# Patient Record
Sex: FEMALE | Race: WHITE | NOT HISPANIC OR LATINO | Employment: FULL TIME | URBAN - METROPOLITAN AREA
[De-identification: names, ages, dates, MRNs, and addresses within clinical notes are randomized per-mention and may not be internally consistent; named-entity substitution may affect disease eponyms.]

---

## 2017-08-30 ENCOUNTER — OFFICE VISIT (OUTPATIENT)
Dept: FAMILY MEDICINE CLINIC | Facility: CLINIC | Age: 52
End: 2017-08-30

## 2017-08-30 VITALS
WEIGHT: 198 LBS | OXYGEN SATURATION: 99 % | RESPIRATION RATE: 16 BRPM | SYSTOLIC BLOOD PRESSURE: 138 MMHG | HEIGHT: 67 IN | DIASTOLIC BLOOD PRESSURE: 74 MMHG | HEART RATE: 55 BPM | BODY MASS INDEX: 31.08 KG/M2

## 2017-08-30 DIAGNOSIS — R07.9 CHEST PAIN, UNSPECIFIED TYPE: Primary | ICD-10-CM

## 2017-08-30 LAB
ARTICHOKE IGE QN: 149 MG/DL (ref 0–130)
CHOLEST SERPL-MCNC: 218 MG/DL (ref 0–200)
HDLC SERPL-MCNC: 56 MG/DL (ref 40–60)
TRIGL SERPL-MCNC: 133 MG/DL (ref 0–150)

## 2017-08-30 PROCEDURE — 36415 COLL VENOUS BLD VENIPUNCTURE: CPT | Performed by: FAMILY MEDICINE

## 2017-08-30 PROCEDURE — 80061 LIPID PANEL: CPT | Performed by: FAMILY MEDICINE

## 2017-08-30 PROCEDURE — 99214 OFFICE O/P EST MOD 30 MIN: CPT | Performed by: FAMILY MEDICINE

## 2017-09-06 PROCEDURE — 93000 ELECTROCARDIOGRAM COMPLETE: CPT | Performed by: FAMILY MEDICINE

## 2017-09-07 ENCOUNTER — HOSPITAL ENCOUNTER (OUTPATIENT)
Dept: CARDIOLOGY | Facility: HOSPITAL | Age: 52
Discharge: HOME | End: 2017-09-07
Attending: FAMILY MEDICINE | Admitting: FAMILY MEDICINE

## 2017-09-07 VITALS
HEART RATE: 66 BPM | WEIGHT: 198 LBS | BODY MASS INDEX: 31.08 KG/M2 | HEIGHT: 67 IN | DIASTOLIC BLOOD PRESSURE: 84 MMHG | SYSTOLIC BLOOD PRESSURE: 120 MMHG

## 2017-09-07 DIAGNOSIS — R07.9 CHEST PAIN, UNSPECIFIED TYPE: ICD-10-CM

## 2017-09-07 PROCEDURE — 93017 CV STRESS TEST TRACING ONLY: CPT

## 2017-09-15 LAB
BH CV STRESS BP STAGE 1: NORMAL
BH CV STRESS BP STAGE 2: NORMAL
BH CV STRESS BP STAGE 3: NORMAL
BH CV STRESS DURATION MIN STAGE 1: 3
BH CV STRESS DURATION MIN STAGE 2: 3
BH CV STRESS DURATION MIN STAGE 3: 3
BH CV STRESS DURATION MIN STAGE 4: 0
BH CV STRESS DURATION MIN STAGE 5: 0
BH CV STRESS DURATION SEC STAGE 1: 0
BH CV STRESS DURATION SEC STAGE 2: 0
BH CV STRESS DURATION SEC STAGE 3: 0
BH CV STRESS DURATION SEC STAGE 4: 50
BH CV STRESS GRADE STAGE 1: 10
BH CV STRESS GRADE STAGE 2: 12
BH CV STRESS GRADE STAGE 3: 14
BH CV STRESS GRADE STAGE 4: 16
BH CV STRESS GRADE STAGE 5: 18
BH CV STRESS HR STAGE 1: 106
BH CV STRESS HR STAGE 2: 137
BH CV STRESS HR STAGE 3: 164
BH CV STRESS METS STAGE 1: 5
BH CV STRESS METS STAGE 2: 7.5
BH CV STRESS METS STAGE 3: 10
BH CV STRESS METS STAGE 4: 13.5
BH CV STRESS METS STAGE 5: 15
BH CV STRESS O2 STAGE 1: 98
BH CV STRESS O2 STAGE 2: 99
BH CV STRESS O2 STAGE 3: 100
BH CV STRESS PROTOCOL 1: NORMAL
BH CV STRESS RECOVERY BP: NORMAL MMHG
BH CV STRESS RECOVERY HR: 84 BPM
BH CV STRESS SPEED STAGE 1: 1.7
BH CV STRESS SPEED STAGE 2: 2.5
BH CV STRESS SPEED STAGE 3: 3.4
BH CV STRESS SPEED STAGE 4: 4.2
BH CV STRESS SPEED STAGE 5: 5
BH CV STRESS STAGE 1: 1
BH CV STRESS STAGE 2: 2
BH CV STRESS STAGE 3: 3
BH CV STRESS STAGE 4: 4
BH CV STRESS STAGE 5: 5
MAXIMAL PREDICTED HEART RATE: 169 BPM
PERCENT MAX PREDICTED HR: 101.18 %
STRESS BASELINE BP: NORMAL MMHG
STRESS BASELINE HR: 65 BPM
STRESS O2 SAT REST: 99 %
STRESS PERCENT HR: 119 %
STRESS POST ESTIMATED WORKLOAD: 11.4 METS
STRESS POST EXERCISE DUR MIN: 9 MIN
STRESS POST EXERCISE DUR SEC: 50 SEC
STRESS POST O2 SAT PEAK: 99 %
STRESS POST PEAK BP: NORMAL MMHG
STRESS POST PEAK HR: 171 BPM
STRESS TARGET HR: 144 BPM

## 2019-02-11 ENCOUNTER — OFFICE VISIT (OUTPATIENT)
Dept: FAMILY MEDICINE CLINIC | Facility: CLINIC | Age: 54
End: 2019-02-11

## 2019-02-11 VITALS
RESPIRATION RATE: 16 BRPM | OXYGEN SATURATION: 98 % | SYSTOLIC BLOOD PRESSURE: 118 MMHG | DIASTOLIC BLOOD PRESSURE: 74 MMHG | HEIGHT: 67 IN | WEIGHT: 180.2 LBS | BODY MASS INDEX: 28.28 KG/M2 | HEART RATE: 64 BPM

## 2019-02-11 DIAGNOSIS — Z00.00 HEALTHCARE MAINTENANCE: Primary | ICD-10-CM

## 2019-02-11 LAB
ALBUMIN SERPL-MCNC: 4.78 G/DL (ref 3.2–4.8)
ALBUMIN/GLOB SERPL: 2.2 G/DL (ref 1.5–2.5)
ALP SERPL-CCNC: 41 U/L (ref 25–100)
ALT SERPL W P-5'-P-CCNC: 23 U/L (ref 7–40)
ANION GAP SERPL CALCULATED.3IONS-SCNC: 6 MMOL/L (ref 3–11)
ARTICHOKE IGE QN: 186 MG/DL (ref 0–130)
AST SERPL-CCNC: 20 U/L (ref 0–33)
BASOPHILS # BLD AUTO: 0.03 10*3/MM3 (ref 0–0.2)
BASOPHILS NFR BLD AUTO: 0.6 % (ref 0–1)
BILIRUB SERPL-MCNC: 0.6 MG/DL (ref 0.3–1.2)
BUN BLD-MCNC: 18 MG/DL (ref 9–23)
BUN/CREAT SERPL: 18.6 (ref 7–25)
CALCIUM SPEC-SCNC: 9.7 MG/DL (ref 8.7–10.4)
CHLORIDE SERPL-SCNC: 109 MMOL/L (ref 99–109)
CHOLEST SERPL-MCNC: 255 MG/DL (ref 0–200)
CO2 SERPL-SCNC: 27 MMOL/L (ref 20–31)
CREAT BLD-MCNC: 0.97 MG/DL (ref 0.6–1.3)
CRP SERPL-MCNC: 0.17 MG/DL (ref 0–1)
DEPRECATED RDW RBC AUTO: 45 FL (ref 37–54)
EOSINOPHIL # BLD AUTO: 0.11 10*3/MM3 (ref 0–0.3)
EOSINOPHIL NFR BLD AUTO: 2.1 % (ref 0–3)
ERYTHROCYTE [DISTWIDTH] IN BLOOD BY AUTOMATED COUNT: 12.9 % (ref 11.3–14.5)
GFR SERPL CREATININE-BSD FRML MDRD: 60 ML/MIN/1.73
GLOBULIN UR ELPH-MCNC: 2.2 GM/DL
GLUCOSE BLD-MCNC: 92 MG/DL (ref 70–100)
HBA1C MFR BLD: 5.3 % (ref 4.8–5.6)
HCT VFR BLD AUTO: 44.8 % (ref 34.5–44)
HCV AB SER DONR QL: NORMAL
HDLC SERPL-MCNC: 59 MG/DL (ref 40–60)
HGB BLD-MCNC: 14.5 G/DL (ref 11.5–15.5)
HIV1+2 AB SER QL: NORMAL
IMM GRANULOCYTES # BLD AUTO: 0.02 10*3/MM3 (ref 0–0.05)
IMM GRANULOCYTES NFR BLD AUTO: 0.4 % (ref 0–0.6)
LYMPHOCYTES # BLD AUTO: 2.01 10*3/MM3 (ref 0.6–4.8)
LYMPHOCYTES NFR BLD AUTO: 39 % (ref 24–44)
MCH RBC QN AUTO: 31 PG (ref 27–31)
MCHC RBC AUTO-ENTMCNC: 32.4 G/DL (ref 32–36)
MCV RBC AUTO: 95.7 FL (ref 80–99)
MONOCYTES # BLD AUTO: 0.34 10*3/MM3 (ref 0–1)
MONOCYTES NFR BLD AUTO: 6.6 % (ref 0–12)
NEUTROPHILS # BLD AUTO: 2.67 10*3/MM3 (ref 1.5–8.3)
NEUTROPHILS NFR BLD AUTO: 51.7 % (ref 41–71)
PLATELET # BLD AUTO: 137 10*3/MM3 (ref 150–450)
PMV BLD AUTO: 11.3 FL (ref 6–12)
POTASSIUM BLD-SCNC: 4.8 MMOL/L (ref 3.5–5.5)
PROT SERPL-MCNC: 7 G/DL (ref 5.7–8.2)
RBC # BLD AUTO: 4.68 10*6/MM3 (ref 3.89–5.14)
SODIUM BLD-SCNC: 142 MMOL/L (ref 132–146)
TRIGL SERPL-MCNC: 69 MG/DL (ref 0–150)
TSH SERPL DL<=0.05 MIU/L-ACNC: 1.9 MIU/ML (ref 0.35–5.35)
URATE SERPL-MCNC: 6.5 MG/DL (ref 3.1–7.8)
WBC NRBC COR # BLD: 5.16 10*3/MM3 (ref 3.5–10.8)

## 2019-02-11 PROCEDURE — 84550 ASSAY OF BLOOD/URIC ACID: CPT | Performed by: FAMILY MEDICINE

## 2019-02-11 PROCEDURE — 83036 HEMOGLOBIN GLYCOSYLATED A1C: CPT | Performed by: FAMILY MEDICINE

## 2019-02-11 PROCEDURE — 99396 PREV VISIT EST AGE 40-64: CPT | Performed by: FAMILY MEDICINE

## 2019-02-11 PROCEDURE — 86140 C-REACTIVE PROTEIN: CPT | Performed by: FAMILY MEDICINE

## 2019-02-11 PROCEDURE — 80053 COMPREHEN METABOLIC PANEL: CPT | Performed by: FAMILY MEDICINE

## 2019-02-11 PROCEDURE — 86803 HEPATITIS C AB TEST: CPT | Performed by: FAMILY MEDICINE

## 2019-02-11 PROCEDURE — 85025 COMPLETE CBC W/AUTO DIFF WBC: CPT | Performed by: FAMILY MEDICINE

## 2019-02-11 PROCEDURE — 80061 LIPID PANEL: CPT | Performed by: FAMILY MEDICINE

## 2019-02-11 PROCEDURE — 84443 ASSAY THYROID STIM HORMONE: CPT | Performed by: FAMILY MEDICINE

## 2019-02-11 PROCEDURE — G0432 EIA HIV-1/HIV-2 SCREEN: HCPCS | Performed by: FAMILY MEDICINE

## 2019-02-11 NOTE — PROGRESS NOTES
Subjective   Nemo Guillermo is a 53 y.o. female.     History of Present Illness   She is here for her annual fasting wellness evaluation.  She is current on her immunizations today.  She voices no acute symptoms.  She continues care with her gynecologist.  She is scheduled for her screening colonoscopy next week with [facility].    Review of Systems   Constitutional: Negative.    HENT: Negative.    Eyes: Negative.    Respiratory: Negative.    Cardiovascular: Negative.    Gastrointestinal: Negative.    Endocrine: Negative.    Genitourinary: Negative.    Musculoskeletal: Negative.    Skin: Negative.    Allergic/Immunologic: Negative.    Neurological: Negative.    Hematological: Negative.    Psychiatric/Behavioral: Negative.        Objective   Physical Exam   Constitutional: She is oriented to person, place, and time. She appears well-developed and well-nourished. She is cooperative.   HENT:   Head: Normocephalic and atraumatic.   Right Ear: Hearing and external ear normal.   Left Ear: Hearing and external ear normal.   Nose: Nose normal.   Mouth/Throat: Uvula is midline, oropharynx is clear and moist and mucous membranes are normal.   Eyes: Conjunctivae and EOM are normal. Pupils are equal, round, and reactive to light. No scleral icterus.   Neck: Trachea normal and normal range of motion. Neck supple. No JVD present. Carotid bruit is not present. No thyromegaly present.   Cardiovascular: Normal rate, regular rhythm, normal heart sounds and intact distal pulses.   Pulmonary/Chest: Effort normal and breath sounds normal.   Abdominal: Soft. Bowel sounds are normal. There is no hepatosplenomegaly. There is no tenderness.   Musculoskeletal: Normal range of motion.   Lymphadenopathy:     She has no cervical adenopathy.   Neurological: She is alert and oriented to person, place, and time. She has normal strength and normal reflexes. No sensory deficit. Gait normal.   Skin: Skin is warm and dry.   Psychiatric: She has a normal  mood and affect. Her speech is normal and behavior is normal. Judgment and thought content normal. Cognition and memory are normal.   Nursing note and vitals reviewed.      Assessment/Plan   Diagnoses and all orders for this visit:    Healthcare maintenance  -     POC Urinalysis Dipstick, Automated  -     Comprehensive Metabolic Panel  -     CBC & Differential  -     Lipid Panel  -     TSH  -     Uric Acid  -     C-reactive Protein  -     HIV-1 / O / 2 Ag / Antibody 4th Generation  -     Hepatitis C Antibody  -     Hemoglobin A1c  -     We discussed the Hepatitis A vaccine and she deferred today.    The patient is here for a health maintenance visit.  Currently, the patient consumes a healthy diet and has an adequate exercise regimen. Screening lab work is ordered.  Immunizations are reported as current.  Advice and education is given regarding nutrition, aerobic exercise, routine dental evaluations, routine eye exams, reproductive health, cardiovascular risk reduction, sunscreen use, self skin examination (annual dermatology evaluations) and seat belt use (general overall safety).  Further recommendations after lab evaluation.  Annual wellness evaluations recommended.

## 2019-02-13 NOTE — PROGRESS NOTES
Your lab results are all satisfactory.  Please follow a low cholesterol diet and repeat a fasting lipid panel in three months.  If you have any questions or concerns, please don't hesitate to contact us.  Thank you.

## 2019-07-03 ENCOUNTER — OFFICE VISIT (OUTPATIENT)
Dept: INTERNAL MEDICINE | Facility: CLINIC | Age: 54
End: 2019-07-03

## 2019-07-03 VITALS
WEIGHT: 183 LBS | DIASTOLIC BLOOD PRESSURE: 70 MMHG | BODY MASS INDEX: 28.72 KG/M2 | SYSTOLIC BLOOD PRESSURE: 112 MMHG | HEART RATE: 70 BPM | HEIGHT: 67 IN | TEMPERATURE: 98.2 F | OXYGEN SATURATION: 99 %

## 2019-07-03 DIAGNOSIS — R20.2 TINGLING IN EXTREMITIES: Primary | ICD-10-CM

## 2019-07-03 PROCEDURE — 99213 OFFICE O/P EST LOW 20 MIN: CPT | Performed by: NURSE PRACTITIONER

## 2019-07-03 NOTE — PROGRESS NOTES
"Chief Complaint   Patient presents with   • tingling in arms     aprox two weeks, more in left arm       History of Present Illness  53 y.o.female presents for tingling.  Pt followed by Dr. Andersen.  Couple weeks tingling most in left arm but also some right hand and here there random places in legs. Feels like pin pricks random \"hard to explain, like a chill moving over area.\" no extremity weakness, numbness, swelling or ROM changes. No facial numbness or tingling or facial droop. No headaches, vision changes, dizziness, chest pain, palpitations, or shortness of air. No recent diarrhea or n/v.  No fever. No rash or recent exposures.  Has had cts before in hands first thought may have been this but feels different.  Pertinent family hx  mom cdip: chronic inflammatory demyelinating polyneuropathy.    Review of Systems   Constitutional: Positive for chills. Negative for diaphoresis, fatigue and fever.   Eyes: Negative for blurred vision, double vision and visual disturbance.   Respiratory: Negative for shortness of breath.    Cardiovascular: Negative for chest pain, palpitations and leg swelling.   Genitourinary: Negative for urinary incontinence and difficulty urinating.   Musculoskeletal: Negative for arthralgias.   Skin: Negative for rash.   Neurological: Negative for dizziness, weakness, light-headedness, numbness and headache.        Tingling pin pricks         PMSFH  The following portions of the patient's history were reviewed and updated as appropriate: allergies, current medications, past family history, past medical history, past social history, past surgical history and problem list.     Social hx:  Tobacco none  Alcohol rarely  Past Medical History:   Diagnosis Date   • DJD (degenerative joint disease), lumbosacral    • MAUREEN (generalized anxiety disorder)       Past Surgical History:   Procedure Laterality Date   • CARPAL TUNNEL RELEASE Bilateral 2010   • HYSTERECTOMY  2011   • TONSILLECTOMY  1968      No " "Known Allergies   Family History   Problem Relation Age of Onset   • Alzheimer's disease Mother    • Diabetes Father    • Heart disease Father    • Heart attack Paternal Grandfather     mom cdip: chronic inflammatory demyelinating polyneuropathy.      No current outpatient medications on file.    VITALS:  /70   Pulse 70   Temp 98.2 °F (36.8 °C) (Oral)   Ht 170.2 cm (67.01\")   Wt 83 kg (183 lb)   SpO2 99%   BMI 28.66 kg/m²     Physical Exam   Constitutional: She is oriented to person, place, and time. Vital signs are normal. She appears well-developed and well-nourished.  Non-toxic appearance. She does not have a sickly appearance. No distress.   HENT:   Head: Normocephalic.   Right Ear: External ear normal.   Left Ear: External ear normal.   Nose: Nose normal.   Mouth/Throat: Oropharynx is clear and moist.   Eyes: Conjunctivae, EOM and lids are normal. Pupils are equal, round, and reactive to light.   Neck: Trachea normal and normal range of motion. Neck supple. No neck rigidity. Normal range of motion present. No thyromegaly present.   Cardiovascular: Normal rate, regular rhythm, normal heart sounds and intact distal pulses.   No edema   Pulmonary/Chest: Effort normal and breath sounds normal. No respiratory distress.   Abdominal: Soft. Bowel sounds are normal. There is no tenderness.   Musculoskeletal: Normal range of motion.   Normal ROM all major joints      During the foot exam she had a monofilament test performed (10/10 normal bilateral).  Lymphadenopathy:        Head (right side): No submental, no submandibular and no tonsillar adenopathy present.        Head (left side): No submental, no submandibular and no tonsillar adenopathy present.     She has no cervical adenopathy.   Neurological: She is alert and oriented to person, place, and time. She displays atrophy. No cranial nerve deficit or sensory deficit. She displays a negative Romberg sign. GCS eye subscore is 4. GCS verbal subscore is 5. " GCS motor subscore is 6.   Face symmetrical no facial droop; no extremity weakness positive sensation throughout, normal vibratory sensation. DTR normal symmetric except bilateral decrease in patellar reflex.   Skin: Skin is warm and dry. Capillary refill takes less than 2 seconds. Turgor is normal. No rash noted.   Psychiatric: She has a normal mood and affect. Her speech is normal and behavior is normal. Cognition and memory are normal.       LABS labs ordered in office today pending.  Results for orders placed or performed in visit on 02/11/19   Comprehensive Metabolic Panel   Result Value Ref Range    Glucose 92 70 - 100 mg/dL    BUN 18 9 - 23 mg/dL    Creatinine 0.97 0.60 - 1.30 mg/dL    Sodium 142 132 - 146 mmol/L    Potassium 4.8 3.5 - 5.5 mmol/L    Chloride 109 99 - 109 mmol/L    CO2 27.0 20.0 - 31.0 mmol/L    Calcium 9.7 8.7 - 10.4 mg/dL    Total Protein 7.0 5.7 - 8.2 g/dL    Albumin 4.78 3.20 - 4.80 g/dL    ALT (SGPT) 23 7 - 40 U/L    AST (SGOT) 20 0 - 33 U/L    Alkaline Phosphatase 41 25 - 100 U/L    Total Bilirubin 0.6 0.3 - 1.2 mg/dL    eGFR Non African Amer 60 (L) >60 mL/min/1.73    Globulin 2.2 gm/dL    A/G Ratio 2.2 1.5 - 2.5 g/dL    BUN/Creatinine Ratio 18.6 7.0 - 25.0    Anion Gap 6.0 3.0 - 11.0 mmol/L   Lipid Panel   Result Value Ref Range    Total Cholesterol 255 (H) 0 - 200 mg/dL    Triglycerides 69 0 - 150 mg/dL    HDL Cholesterol 59 40 - 60 mg/dL    LDL Cholesterol  186 (H) 0 - 130 mg/dL   TSH   Result Value Ref Range    TSH 1.902 0.350 - 5.350 mIU/mL   Uric Acid   Result Value Ref Range    Uric Acid 6.5 3.1 - 7.8 mg/dL   C-reactive Protein   Result Value Ref Range    C-Reactive Protein 0.17 0.00 - 1.00 mg/dL   HIV-1 / O / 2 Ag / Antibody 4th Generation   Result Value Ref Range    HIV-1/ HIV-2 Non-Reactive Non-Reactive   Hepatitis C Antibody   Result Value Ref Range    Hepatitis C Ab Non-Reactive Non-Reactive   Hemoglobin A1c   Result Value Ref Range    Hemoglobin A1C 5.30 4.80 - 5.60 %   CBC  Auto Differential   Result Value Ref Range    WBC 5.16 3.50 - 10.80 10*3/mm3    RBC 4.68 3.89 - 5.14 10*6/mm3    Hemoglobin 14.5 11.5 - 15.5 g/dL    Hematocrit 44.8 (H) 34.5 - 44.0 %    MCV 95.7 80.0 - 99.0 fL    MCH 31.0 27.0 - 31.0 pg    MCHC 32.4 32.0 - 36.0 g/dL    RDW 12.9 11.3 - 14.5 %    RDW-SD 45.0 37.0 - 54.0 fl    MPV 11.3 6.0 - 12.0 fL    Platelets 137 (L) 150 - 450 10*3/mm3    Neutrophil % 51.7 41.0 - 71.0 %    Lymphocyte % 39.0 24.0 - 44.0 %    Monocyte % 6.6 0.0 - 12.0 %    Eosinophil % 2.1 0.0 - 3.0 %    Basophil % 0.6 0.0 - 1.0 %    Immature Grans % 0.4 0.0 - 0.6 %    Neutrophils, Absolute 2.67 1.50 - 8.30 10*3/mm3    Lymphocytes, Absolute 2.01 0.60 - 4.80 10*3/mm3    Monocytes, Absolute 0.34 0.00 - 1.00 10*3/mm3    Eosinophils, Absolute 0.11 0.00 - 0.30 10*3/mm3    Basophils, Absolute 0.03 0.00 - 0.20 10*3/mm3    Immature Grans, Absolute 0.02 0.00 - 0.05 10*3/mm3       ASSESSMENT/PLAN  Nemo was seen today for tingling in arms.    Diagnoses and all orders for this visit:    Tingling in extremities  -     Ambulatory Referral to Neurology  -     CBC & Differential; Future  -     Comprehensive Metabolic Panel; Future  -     Vitamin B12; Future  -     Sedimentation Rate; Future  -     C-reactive Protein; Future    were not able to get enough blood in office today.  Encouraged hydration. Pt is to stop by office in morning for lab draw with phlebotomist.  Neuro exam exam normal except mild decrease in patellar reflexes.   In addition to lab check will have her see neuro with her family history. Pt requests Dr. Preston with [facility] neurology.  FU with pcp.  Pt advised if worsening symptoms or any headache, dizziness, facial numbness tingling, weakness should seek emergency room care.    I discussed the patients findings and my recommendations with patient.  Patient was encouraged to keep me informed of any acute changes, lack of improvement, or any new concerning symptoms.    Patient voiced understanding of  all instructions and denied further questions.      FOLLOW-UP  Return in about 2 weeks (around 7/17/2019), or if symptoms worsen or fail to improve, for Recheck with PCP.    Electronically signed by:    LARS Petty  07/03/2019

## 2019-07-08 ENCOUNTER — LAB (OUTPATIENT)
Dept: INTERNAL MEDICINE | Facility: CLINIC | Age: 54
End: 2019-07-08

## 2019-07-08 ENCOUNTER — TELEPHONE (OUTPATIENT)
Dept: INTERNAL MEDICINE | Facility: CLINIC | Age: 54
End: 2019-07-08

## 2019-07-08 DIAGNOSIS — E78.5 HYPERLIPIDEMIA, UNSPECIFIED HYPERLIPIDEMIA TYPE: Primary | ICD-10-CM

## 2019-07-08 DIAGNOSIS — R20.2 TINGLING IN EXTREMITIES: ICD-10-CM

## 2019-07-08 NOTE — TELEPHONE ENCOUNTER
"Pt came in on Wednesday and was having some tingling in her arms, pt came in today and stated that Rylee would not listen to her when she stated she was a hard stick, and pt has been stuck 5 times rylee offered pt to go to hospital and she thinks that's just extreme, but I did talk to her into going explaining there is no wait time, she states that rylee said  \"she is a lab person and that we aren't\"    She was also wondering if you would add on lipids and a sub level of LDL due to her LDL high a while back.       "

## 2019-07-09 ENCOUNTER — LAB (OUTPATIENT)
Dept: LAB | Facility: HOSPITAL | Age: 54
End: 2019-07-09

## 2019-07-09 DIAGNOSIS — E78.49 OTHER HYPERLIPIDEMIA: Primary | ICD-10-CM

## 2019-07-09 DIAGNOSIS — E78.5 HYPERLIPIDEMIA, UNSPECIFIED HYPERLIPIDEMIA TYPE: ICD-10-CM

## 2019-07-09 LAB
ALBUMIN SERPL-MCNC: 4.8 G/DL (ref 3.5–5.2)
ALBUMIN/GLOB SERPL: 1.9 G/DL
ALP SERPL-CCNC: 41 U/L (ref 39–117)
ALT SERPL W P-5'-P-CCNC: 25 U/L (ref 1–33)
ANION GAP SERPL CALCULATED.3IONS-SCNC: 12.3 MMOL/L (ref 5–15)
AST SERPL-CCNC: 23 U/L (ref 1–32)
BASOPHILS # BLD AUTO: 0.03 10*3/MM3 (ref 0–0.2)
BASOPHILS NFR BLD AUTO: 0.7 % (ref 0–1.5)
BILIRUB SERPL-MCNC: 0.3 MG/DL (ref 0.2–1.2)
BUN BLD-MCNC: 13 MG/DL (ref 6–20)
BUN/CREAT SERPL: 14.6 (ref 7–25)
CALCIUM SPEC-SCNC: 9.8 MG/DL (ref 8.6–10.5)
CHLORIDE SERPL-SCNC: 104 MMOL/L (ref 98–107)
CHOLEST SERPL-MCNC: 222 MG/DL (ref 0–200)
CO2 SERPL-SCNC: 27.7 MMOL/L (ref 22–29)
CREAT BLD-MCNC: 0.89 MG/DL (ref 0.57–1)
CRP SERPL-MCNC: 0.1 MG/DL (ref 0–0.5)
DEPRECATED RDW RBC AUTO: 41.4 FL (ref 37–54)
EOSINOPHIL # BLD AUTO: 0.09 10*3/MM3 (ref 0–0.4)
EOSINOPHIL NFR BLD AUTO: 2 % (ref 0.3–6.2)
ERYTHROCYTE [DISTWIDTH] IN BLOOD BY AUTOMATED COUNT: 11.9 % (ref 12.3–15.4)
ERYTHROCYTE [SEDIMENTATION RATE] IN BLOOD: 4 MM/HR (ref 0–30)
GFR SERPL CREATININE-BSD FRML MDRD: 66 ML/MIN/1.73
GLOBULIN UR ELPH-MCNC: 2.5 GM/DL
GLUCOSE BLD-MCNC: 90 MG/DL (ref 65–99)
HCT VFR BLD AUTO: 42.3 % (ref 34–46.6)
HDLC SERPL-MCNC: 61 MG/DL (ref 40–60)
HGB BLD-MCNC: 13.8 G/DL (ref 12–15.9)
LDLC SERPL CALC-MCNC: 146 MG/DL (ref 0–100)
LDLC/HDLC SERPL: 2.39 {RATIO}
LYMPHOCYTES # BLD AUTO: 1.73 10*3/MM3 (ref 0.7–3.1)
LYMPHOCYTES NFR BLD AUTO: 39.1 % (ref 19.6–45.3)
MCH RBC QN AUTO: 30.9 PG (ref 26.6–33)
MCHC RBC AUTO-ENTMCNC: 32.6 G/DL (ref 31.5–35.7)
MCV RBC AUTO: 94.8 FL (ref 79–97)
MONOCYTES # BLD AUTO: 0.31 10*3/MM3 (ref 0.1–0.9)
MONOCYTES NFR BLD AUTO: 7 % (ref 5–12)
NEUTROPHILS # BLD AUTO: 2.26 10*3/MM3 (ref 1.7–7)
NEUTROPHILS NFR BLD AUTO: 51 % (ref 42.7–76)
PLATELET # BLD AUTO: 131 10*3/MM3 (ref 140–450)
PMV BLD AUTO: 10.8 FL (ref 6–12)
POTASSIUM BLD-SCNC: 4.4 MMOL/L (ref 3.5–5.2)
PROT SERPL-MCNC: 7.3 G/DL (ref 6–8.5)
RBC # BLD AUTO: 4.46 10*6/MM3 (ref 3.77–5.28)
SODIUM BLD-SCNC: 144 MMOL/L (ref 136–145)
TRIGL SERPL-MCNC: 75 MG/DL (ref 0–150)
VIT B12 BLD-MCNC: 495 PG/ML (ref 211–946)
VLDLC SERPL-MCNC: 15 MG/DL (ref 5–40)
WBC NRBC COR # BLD: 4.43 10*3/MM3 (ref 3.4–10.8)

## 2019-07-09 PROCEDURE — 80061 LIPID PANEL: CPT

## 2019-07-09 PROCEDURE — 82607 VITAMIN B-12: CPT

## 2019-07-09 PROCEDURE — 80053 COMPREHEN METABOLIC PANEL: CPT

## 2019-07-09 PROCEDURE — 85652 RBC SED RATE AUTOMATED: CPT

## 2019-07-09 PROCEDURE — 85025 COMPLETE CBC W/AUTO DIFF WBC: CPT

## 2019-07-09 PROCEDURE — 36415 COLL VENOUS BLD VENIPUNCTURE: CPT

## 2019-07-09 PROCEDURE — 86140 C-REACTIVE PROTEIN: CPT

## 2019-07-12 ENCOUNTER — TELEPHONE (OUTPATIENT)
Dept: INTERNAL MEDICINE | Facility: CLINIC | Age: 54
End: 2019-07-12

## 2019-07-12 NOTE — TELEPHONE ENCOUNTER
----- Message from LARS Rockwell sent at 7/12/2019  1:57 PM EDT -----  Please contact patient regarding results.  Electrolytes normal; liver function normal.  Kidney function 66%; this is where she typically is for last couple years.  B12 normal.   CBC/blood count: no anemia.  Lipid: cholesterol 222 (down form 255) LDL bad cholesterol 146 (down from 186).  Sed rate inflammatory marker normal. I don't see any thing on her labs to account for her sx. Keep appt with neurology. Good job on cholesterol improvement.  Thanks.

## 2024-03-25 ENCOUNTER — APPOINTMENT (OUTPATIENT)
Dept: GENERAL RADIOLOGY | Facility: HOSPITAL | Age: 59
End: 2024-03-25
Payer: COMMERCIAL

## 2024-03-25 ENCOUNTER — HOSPITAL ENCOUNTER (OUTPATIENT)
Facility: HOSPITAL | Age: 59
Discharge: HOME | End: 2024-03-27
Attending: EMERGENCY MEDICINE | Admitting: INTERNAL MEDICINE
Payer: COMMERCIAL

## 2024-03-25 DIAGNOSIS — R07.9 CHEST PAIN IN ADULT: Primary | ICD-10-CM

## 2024-03-25 DIAGNOSIS — R07.89 OTHER CHEST PAIN: ICD-10-CM

## 2024-03-25 LAB
BASOPHILS # BLD AUTO: 0.03 10*3/MM3 (ref 0–0.2)
BASOPHILS NFR BLD AUTO: 0.5 % (ref 0–1.5)
DEPRECATED RDW RBC AUTO: 43.5 FL (ref 37–54)
EOSINOPHIL # BLD AUTO: 0.17 10*3/MM3 (ref 0–0.4)
EOSINOPHIL NFR BLD AUTO: 3.1 % (ref 0.3–6.2)
ERYTHROCYTE [DISTWIDTH] IN BLOOD BY AUTOMATED COUNT: 12.6 % (ref 12.3–15.4)
HCT VFR BLD AUTO: 42.8 % (ref 34–46.6)
HGB BLD-MCNC: 14.2 G/DL (ref 12–15.9)
IMM GRANULOCYTES # BLD AUTO: 0.02 10*3/MM3 (ref 0–0.05)
IMM GRANULOCYTES NFR BLD AUTO: 0.4 % (ref 0–0.5)
LYMPHOCYTES # BLD AUTO: 2.54 10*3/MM3 (ref 0.7–3.1)
LYMPHOCYTES NFR BLD AUTO: 46.1 % (ref 19.6–45.3)
MCH RBC QN AUTO: 31.4 PG (ref 26.6–33)
MCHC RBC AUTO-ENTMCNC: 33.2 G/DL (ref 31.5–35.7)
MCV RBC AUTO: 94.7 FL (ref 79–97)
MONOCYTES # BLD AUTO: 0.37 10*3/MM3 (ref 0.1–0.9)
MONOCYTES NFR BLD AUTO: 6.7 % (ref 5–12)
NEUTROPHILS NFR BLD AUTO: 2.38 10*3/MM3 (ref 1.7–7)
NEUTROPHILS NFR BLD AUTO: 43.2 % (ref 42.7–76)
NRBC BLD AUTO-RTO: 0 /100 WBC (ref 0–0.2)
PLATELET # BLD AUTO: 143 10*3/MM3 (ref 140–450)
PMV BLD AUTO: 10.4 FL (ref 6–12)
QT INTERVAL: 436 MS
QTC INTERVAL: 424 MS
RBC # BLD AUTO: 4.52 10*6/MM3 (ref 3.77–5.28)
WBC NRBC COR # BLD AUTO: 5.51 10*3/MM3 (ref 3.4–10.8)

## 2024-03-25 PROCEDURE — 85025 COMPLETE CBC W/AUTO DIFF WBC: CPT | Performed by: EMERGENCY MEDICINE

## 2024-03-25 PROCEDURE — 99285 EMERGENCY DEPT VISIT HI MDM: CPT

## 2024-03-25 PROCEDURE — 83880 ASSAY OF NATRIURETIC PEPTIDE: CPT | Performed by: EMERGENCY MEDICINE

## 2024-03-25 PROCEDURE — 85379 FIBRIN DEGRADATION QUANT: CPT | Performed by: EMERGENCY MEDICINE

## 2024-03-25 PROCEDURE — 93005 ELECTROCARDIOGRAM TRACING: CPT | Performed by: EMERGENCY MEDICINE

## 2024-03-25 PROCEDURE — 80053 COMPREHEN METABOLIC PANEL: CPT | Performed by: EMERGENCY MEDICINE

## 2024-03-25 PROCEDURE — 71045 X-RAY EXAM CHEST 1 VIEW: CPT

## 2024-03-25 PROCEDURE — 84484 ASSAY OF TROPONIN QUANT: CPT | Performed by: EMERGENCY MEDICINE

## 2024-03-25 PROCEDURE — 83690 ASSAY OF LIPASE: CPT | Performed by: EMERGENCY MEDICINE

## 2024-03-25 PROCEDURE — 36415 COLL VENOUS BLD VENIPUNCTURE: CPT

## 2024-03-25 RX ORDER — SODIUM CHLORIDE 0.9 % (FLUSH) 0.9 %
10 SYRINGE (ML) INJECTION AS NEEDED
Status: DISCONTINUED | OUTPATIENT
Start: 2024-03-25 | End: 2024-03-27 | Stop reason: HOSPADM

## 2024-03-25 RX ORDER — ASPIRIN 81 MG/1
324 TABLET, CHEWABLE ORAL ONCE
Status: COMPLETED | OUTPATIENT
Start: 2024-03-25 | End: 2024-03-26

## 2024-03-25 NOTE — Clinical Note
Level of Care: Telemetry [5]   Diagnosis: Chest pain [044497]   Admitting Physician: JULIO LEAL III [[id number]   Attending Physician: JULIO LEAL III [[id number]]   Bed Request Comments: tele obs (CDU)

## 2024-03-26 ENCOUNTER — APPOINTMENT (OUTPATIENT)
Dept: CARDIOLOGY | Facility: HOSPITAL | Age: 59
End: 2024-03-26
Payer: COMMERCIAL

## 2024-03-26 PROBLEM — R07.9 CHEST PAIN: Status: ACTIVE | Noted: 2024-03-26

## 2024-03-26 LAB
ALBUMIN SERPL-MCNC: 4.6 G/DL (ref 3.5–5.2)
ALBUMIN/GLOB SERPL: 2 G/DL
ALP SERPL-CCNC: 65 U/L (ref 39–117)
ALT SERPL W P-5'-P-CCNC: 42 U/L (ref 1–33)
ANION GAP SERPL CALCULATED.3IONS-SCNC: 10 MMOL/L (ref 5–15)
AST SERPL-CCNC: 41 U/L (ref 1–32)
BH CV ECHO MEAS - AO MAX PG: 8.6 MMHG
BH CV ECHO MEAS - AO MEAN PG: 4.5 MMHG
BH CV ECHO MEAS - AO ROOT DIAM: 3.1 CM
BH CV ECHO MEAS - AO V2 MAX: 146.5 CM/SEC
BH CV ECHO MEAS - AO V2 VTI: 33.9 CM
BH CV ECHO MEAS - AVA(I,D): 2.31 CM2
BH CV ECHO MEAS - EDV(CUBED): 91.1 ML
BH CV ECHO MEAS - EDV(MOD-SP2): 101 ML
BH CV ECHO MEAS - EDV(MOD-SP4): 113 ML
BH CV ECHO MEAS - EF(MOD-BP): 56 %
BH CV ECHO MEAS - EF(MOD-SP2): 53.5 %
BH CV ECHO MEAS - EF(MOD-SP4): 60.2 %
BH CV ECHO MEAS - ESV(CUBED): 27 ML
BH CV ECHO MEAS - ESV(MOD-SP2): 47 ML
BH CV ECHO MEAS - ESV(MOD-SP4): 45 ML
BH CV ECHO MEAS - FS: 33.3 %
BH CV ECHO MEAS - IVS/LVPW: 1 CM
BH CV ECHO MEAS - IVSD: 1.1 CM
BH CV ECHO MEAS - LA DIMENSION: 3.2 CM
BH CV ECHO MEAS - LAT PEAK E' VEL: 10.4 CM/SEC
BH CV ECHO MEAS - LV MASS(C)D: 175 GRAMS
BH CV ECHO MEAS - LV MAX PG: 4.3 MMHG
BH CV ECHO MEAS - LV MEAN PG: 2 MMHG
BH CV ECHO MEAS - LV V1 MAX: 103.8 CM/SEC
BH CV ECHO MEAS - LV V1 VTI: 24.9 CM
BH CV ECHO MEAS - LVIDD: 4.5 CM
BH CV ECHO MEAS - LVIDS: 3 CM
BH CV ECHO MEAS - LVOT AREA: 3.1 CM2
BH CV ECHO MEAS - LVOT DIAM: 2 CM
BH CV ECHO MEAS - LVPWD: 1.1 CM
BH CV ECHO MEAS - MED PEAK E' VEL: 7.2 CM/SEC
BH CV ECHO MEAS - MV A MAX VEL: 69.8 CM/SEC
BH CV ECHO MEAS - MV DEC SLOPE: 291 CM/SEC2
BH CV ECHO MEAS - MV DEC TIME: 0.23 SEC
BH CV ECHO MEAS - MV E MAX VEL: 53.3 CM/SEC
BH CV ECHO MEAS - MV E/A: 0.76
BH CV ECHO MEAS - MV MAX PG: 2.3 MMHG
BH CV ECHO MEAS - MV MEAN PG: 1 MMHG
BH CV ECHO MEAS - MV P1/2T: 70.1 MSEC
BH CV ECHO MEAS - MV V2 VTI: 23.1 CM
BH CV ECHO MEAS - MVA(P1/2T): 3.1 CM2
BH CV ECHO MEAS - MVA(VTI): 3.4 CM2
BH CV ECHO MEAS - PA ACC TIME: 0.15 SEC
BH CV ECHO MEAS - PA V2 MAX: 86.6 CM/SEC
BH CV ECHO MEAS - PI END-D VEL: 59.4 CM/SEC
BH CV ECHO MEAS - RAP SYSTOLE: 8 MMHG
BH CV ECHO MEAS - RVSP: 17 MMHG
BH CV ECHO MEAS - SV(LVOT): 78.2 ML
BH CV ECHO MEAS - SV(MOD-SP2): 54 ML
BH CV ECHO MEAS - SV(MOD-SP4): 68 ML
BH CV ECHO MEAS - TAPSE (>1.6): 2 CM
BH CV ECHO MEAS - TR MAX PG: 9 MMHG
BH CV ECHO MEAS - TR MAX VEL: 152 CM/SEC
BH CV ECHO MEASUREMENTS AVERAGE E/E' RATIO: 6.06
BH CV REST NUCLEAR ISOTOPE DOSE: 22.3 MCI
BH CV STRESS BP STAGE 1: NORMAL
BH CV STRESS BP STAGE 3: NORMAL
BH CV STRESS COMMENTS STAGE 1: NORMAL
BH CV STRESS DOSE REGADENOSON STAGE 1: 0.4
BH CV STRESS DURATION MIN STAGE 1: 1
BH CV STRESS DURATION MIN STAGE 2: 1
BH CV STRESS DURATION MIN STAGE 3: 1
BH CV STRESS DURATION MIN STAGE 4: 1
BH CV STRESS DURATION SEC STAGE 1: 10
BH CV STRESS DURATION SEC STAGE 2: 0
BH CV STRESS DURATION SEC STAGE 3: 0
BH CV STRESS DURATION SEC STAGE 4: 0
BH CV STRESS HR STAGE 1: 99
BH CV STRESS HR STAGE 2: 88
BH CV STRESS HR STAGE 3: 72
BH CV STRESS HR STAGE 4: 60
BH CV STRESS NUCLEAR ISOTOPE DOSE: 22.3 MCI
BH CV STRESS O2 STAGE 1: 100
BH CV STRESS O2 STAGE 2: 100
BH CV STRESS O2 STAGE 3: 100
BH CV STRESS O2 STAGE 4: 100
BH CV STRESS PROTOCOL 1: NORMAL
BH CV STRESS RECOVERY BP: NORMAL MMHG
BH CV STRESS RECOVERY HR: 56 BPM
BH CV STRESS RECOVERY O2: 100 %
BH CV STRESS STAGE 1: 1
BH CV STRESS STAGE 2: 2
BH CV STRESS STAGE 3: 3
BH CV STRESS STAGE 4: 4
BH CV XLRA - RV BASE: 3.7 CM
BH CV XLRA - RV LENGTH: 8.5 CM
BH CV XLRA - RV MID: 3.2 CM
BH CV XLRA - TDI S': 10.6 CM/SEC
BILIRUB SERPL-MCNC: 0.3 MG/DL (ref 0–1.2)
BUN SERPL-MCNC: 9 MG/DL (ref 6–20)
BUN/CREAT SERPL: 10.6 (ref 7–25)
CALCIUM SPEC-SCNC: 9 MG/DL (ref 8.6–10.5)
CHLORIDE SERPL-SCNC: 109 MMOL/L (ref 98–107)
CHOLEST SERPL-MCNC: 211 MG/DL (ref 0–200)
CO2 SERPL-SCNC: 27 MMOL/L (ref 22–29)
CREAT SERPL-MCNC: 0.85 MG/DL (ref 0.57–1)
D DIMER PPP FEU-MCNC: <0.27 MCGFEU/ML (ref 0–0.58)
EGFRCR SERPLBLD CKD-EPI 2021: 79.5 ML/MIN/1.73
GEN 5 2HR TROPONIN T REFLEX: 28 NG/L
GLOBULIN UR ELPH-MCNC: 2.3 GM/DL
GLUCOSE SERPL-MCNC: 99 MG/DL (ref 65–99)
HBA1C MFR BLD: 5.5 % (ref 4.8–5.6)
HDLC SERPL-MCNC: 48 MG/DL (ref 40–60)
HOLD SPECIMEN: NORMAL
LDLC SERPL CALC-MCNC: 147 MG/DL (ref 0–100)
LDLC/HDLC SERPL: 3.02 {RATIO}
LEFT ATRIUM VOLUME INDEX: 31.9 ML/M2
LIPASE SERPL-CCNC: 29 U/L (ref 13–60)
LV EF NUC BP: 63 %
MAXIMAL PREDICTED HEART RATE: 162 BPM
NT-PROBNP SERPL-MCNC: 38.8 PG/ML (ref 0–900)
PERCENT MAX PREDICTED HR: 61.11 %
POTASSIUM SERPL-SCNC: 4.1 MMOL/L (ref 3.5–5.2)
PROT SERPL-MCNC: 6.9 G/DL (ref 6–8.5)
QT INTERVAL: 450 MS
QTC INTERVAL: 414 MS
SODIUM SERPL-SCNC: 146 MMOL/L (ref 136–145)
STRESS BASELINE BP: NORMAL MMHG
STRESS BASELINE HR: 54 BPM
STRESS O2 SAT REST: 98 %
STRESS PERCENT HR: 72 %
STRESS POST ESTIMATED WORKLOAD: 1 METS
STRESS POST EXERCISE DUR MIN: 4 MIN
STRESS POST EXERCISE DUR SEC: 0 SEC
STRESS POST O2 SAT PEAK: 100 %
STRESS POST PEAK BP: NORMAL MMHG
STRESS POST PEAK HR: 99 BPM
STRESS TARGET HR: 138 BPM
TRIGL SERPL-MCNC: 90 MG/DL (ref 0–150)
TROPONIN T DELTA: 3 NG/L
TROPONIN T SERPL HS-MCNC: 25 NG/L
VLDLC SERPL-MCNC: 16 MG/DL (ref 5–40)
WHOLE BLOOD HOLD COAG: NORMAL
WHOLE BLOOD HOLD SPECIMEN: NORMAL

## 2024-03-26 PROCEDURE — 99204 OFFICE O/P NEW MOD 45 MIN: CPT | Performed by: INTERNAL MEDICINE

## 2024-03-26 PROCEDURE — 83036 HEMOGLOBIN GLYCOSYLATED A1C: CPT | Performed by: INTERNAL MEDICINE

## 2024-03-26 PROCEDURE — 0 RUBIDIUM CHLORIDE: Performed by: INTERNAL MEDICINE

## 2024-03-26 PROCEDURE — 99222 1ST HOSP IP/OBS MODERATE 55: CPT | Performed by: PHYSICIAN ASSISTANT

## 2024-03-26 PROCEDURE — 93005 ELECTROCARDIOGRAM TRACING: CPT | Performed by: EMERGENCY MEDICINE

## 2024-03-26 PROCEDURE — 93018 CV STRESS TEST I&R ONLY: CPT | Performed by: INTERNAL MEDICINE

## 2024-03-26 PROCEDURE — 25010000002 REGADENOSON 0.4 MG/5ML SOLUTION: Performed by: INTERNAL MEDICINE

## 2024-03-26 PROCEDURE — G0378 HOSPITAL OBSERVATION PER HR: HCPCS

## 2024-03-26 PROCEDURE — 78431 MYOCRD IMG PET RST&STRS CT: CPT

## 2024-03-26 PROCEDURE — 78431 MYOCRD IMG PET RST&STRS CT: CPT | Performed by: INTERNAL MEDICINE

## 2024-03-26 PROCEDURE — 25010000002 HEPARIN (PORCINE) PER 1000 UNITS: Performed by: PHYSICIAN ASSISTANT

## 2024-03-26 PROCEDURE — 93306 TTE W/DOPPLER COMPLETE: CPT | Performed by: INTERNAL MEDICINE

## 2024-03-26 PROCEDURE — A9555 RB82 RUBIDIUM: HCPCS | Performed by: INTERNAL MEDICINE

## 2024-03-26 PROCEDURE — 84484 ASSAY OF TROPONIN QUANT: CPT | Performed by: EMERGENCY MEDICINE

## 2024-03-26 PROCEDURE — 93017 CV STRESS TEST TRACING ONLY: CPT

## 2024-03-26 PROCEDURE — 80061 LIPID PANEL: CPT | Performed by: PHYSICIAN ASSISTANT

## 2024-03-26 PROCEDURE — 93306 TTE W/DOPPLER COMPLETE: CPT

## 2024-03-26 PROCEDURE — 96372 THER/PROPH/DIAG INJ SC/IM: CPT

## 2024-03-26 RX ORDER — HEPARIN SODIUM 5000 [USP'U]/ML
5000 INJECTION, SOLUTION INTRAVENOUS; SUBCUTANEOUS EVERY 8 HOURS SCHEDULED
Status: DISCONTINUED | OUTPATIENT
Start: 2024-03-26 | End: 2024-03-27 | Stop reason: HOSPADM

## 2024-03-26 RX ORDER — SODIUM CHLORIDE 9 MG/ML
40 INJECTION, SOLUTION INTRAVENOUS AS NEEDED
Status: DISCONTINUED | OUTPATIENT
Start: 2024-03-26 | End: 2024-03-27 | Stop reason: HOSPADM

## 2024-03-26 RX ORDER — ONDANSETRON 4 MG/1
4 TABLET, ORALLY DISINTEGRATING ORAL EVERY 6 HOURS PRN
Status: DISCONTINUED | OUTPATIENT
Start: 2024-03-26 | End: 2024-03-27 | Stop reason: HOSPADM

## 2024-03-26 RX ORDER — SODIUM CHLORIDE 0.9 % (FLUSH) 0.9 %
10 SYRINGE (ML) INJECTION EVERY 12 HOURS SCHEDULED
Status: CANCELLED | OUTPATIENT
Start: 2024-03-26

## 2024-03-26 RX ORDER — CHOLECALCIFEROL (VITAMIN D3) 125 MCG
5 CAPSULE ORAL NIGHTLY PRN
Status: DISCONTINUED | OUTPATIENT
Start: 2024-03-26 | End: 2024-03-27 | Stop reason: HOSPADM

## 2024-03-26 RX ORDER — ASPIRIN 81 MG/1
81 TABLET ORAL DAILY
Status: DISCONTINUED | OUTPATIENT
Start: 2024-03-26 | End: 2024-03-27 | Stop reason: HOSPADM

## 2024-03-26 RX ORDER — REGADENOSON 0.08 MG/ML
0.4 INJECTION, SOLUTION INTRAVENOUS ONCE
Status: COMPLETED | OUTPATIENT
Start: 2024-03-26 | End: 2024-03-26

## 2024-03-26 RX ORDER — SODIUM CHLORIDE 9 MG/ML
40 INJECTION, SOLUTION INTRAVENOUS AS NEEDED
Status: CANCELLED | OUTPATIENT
Start: 2024-03-26

## 2024-03-26 RX ORDER — SODIUM CHLORIDE 0.9 % (FLUSH) 0.9 %
10 SYRINGE (ML) INJECTION AS NEEDED
Status: CANCELLED | OUTPATIENT
Start: 2024-03-26

## 2024-03-26 RX ORDER — ONDANSETRON 2 MG/ML
4 INJECTION INTRAMUSCULAR; INTRAVENOUS EVERY 6 HOURS PRN
Status: DISCONTINUED | OUTPATIENT
Start: 2024-03-26 | End: 2024-03-27 | Stop reason: HOSPADM

## 2024-03-26 RX ORDER — ACETAMINOPHEN 325 MG/1
650 TABLET ORAL EVERY 4 HOURS PRN
Status: DISCONTINUED | OUTPATIENT
Start: 2024-03-26 | End: 2024-03-27 | Stop reason: HOSPADM

## 2024-03-26 RX ORDER — SODIUM CHLORIDE 9 MG/ML
100 INJECTION, SOLUTION INTRAVENOUS CONTINUOUS
Status: CANCELLED | OUTPATIENT
Start: 2024-03-27

## 2024-03-26 RX ORDER — NITROGLYCERIN 0.4 MG/1
0.4 TABLET SUBLINGUAL
Status: DISCONTINUED | OUTPATIENT
Start: 2024-03-26 | End: 2024-03-27 | Stop reason: HOSPADM

## 2024-03-26 RX ORDER — SODIUM CHLORIDE 0.9 % (FLUSH) 0.9 %
10 SYRINGE (ML) INJECTION AS NEEDED
Status: DISCONTINUED | OUTPATIENT
Start: 2024-03-26 | End: 2024-03-27 | Stop reason: HOSPADM

## 2024-03-26 RX ORDER — ASPIRIN 81 MG/1
81 TABLET ORAL DAILY
Status: CANCELLED | OUTPATIENT
Start: 2024-03-27

## 2024-03-26 RX ORDER — SODIUM CHLORIDE 0.9 % (FLUSH) 0.9 %
10 SYRINGE (ML) INJECTION EVERY 12 HOURS SCHEDULED
Status: DISCONTINUED | OUTPATIENT
Start: 2024-03-26 | End: 2024-03-27 | Stop reason: HOSPADM

## 2024-03-26 RX ORDER — ASPIRIN 81 MG/1
324 TABLET, CHEWABLE ORAL ONCE
Status: CANCELLED | OUTPATIENT
Start: 2024-03-26 | End: 2024-03-26

## 2024-03-26 RX ADMIN — Medication 10 ML: at 08:58

## 2024-03-26 RX ADMIN — HEPARIN SODIUM 5000 UNITS: 5000 INJECTION INTRAVENOUS; SUBCUTANEOUS at 08:58

## 2024-03-26 RX ADMIN — RUBIDIUM CHLORIDE RB-82 1 DOSE: 150 INJECTION, SOLUTION INTRAVENOUS at 10:36

## 2024-03-26 RX ADMIN — REGADENOSON 0.4 MG: 0.08 INJECTION, SOLUTION INTRAVENOUS at 10:46

## 2024-03-26 RX ADMIN — HEPARIN SODIUM 5000 UNITS: 5000 INJECTION INTRAVENOUS; SUBCUTANEOUS at 23:02

## 2024-03-26 RX ADMIN — ASPIRIN 324 MG: 81 TABLET, CHEWABLE ORAL at 00:10

## 2024-03-26 RX ADMIN — ASPIRIN 81 MG: 81 TABLET, COATED ORAL at 08:58

## 2024-03-26 RX ADMIN — Medication 10 ML: at 23:01

## 2024-03-26 RX ADMIN — HEPARIN SODIUM 5000 UNITS: 5000 INJECTION INTRAVENOUS; SUBCUTANEOUS at 13:38

## 2024-03-26 RX ADMIN — Medication 5 MG: at 23:02

## 2024-03-26 RX ADMIN — RUBIDIUM CHLORIDE RB-82 1 DOSE: 150 INJECTION, SOLUTION INTRAVENOUS at 10:48

## 2024-03-26 NOTE — CONSULTS
Cardiology Consult         Reason for consultation:  Chest pain    Requesting provider: Jason Mcfadden MD  Consulting provider: Sean Regalado MD        Active Hospital Problems    Diagnosis  POA    **Chest pain [R07.9]  Yes              History of present illness: Nemo Guillermo is a 58-year-old female with no prior cardiac history, who is seen today in consultation for chest pain.  Patient presented to [facility] ED 3/25 with complaints of waxing and waning substernal chest pressure that started yesterday while riding in the car, on a lengthy trip visiting a family member.  Workup significant for negative D-dimer, troponin of 25--> 28, and CXR negative for acute findings.  Patient reports normal stress test in 2017 with prior episode of chest pain, which was attributed to anxiety.  Family history of heart disease in father.  Patient works in an administrative position, at desk for much of the day, and is admittedly quite sedentary.  She does not smoke, drink, or use illicit drugs.  On exam, chest pain has improved somewhat, but is not completely resolved.      No Known Allergies    Prior to Admission medications    Not on File       Past Medical History:   Diagnosis Date    DJD (degenerative joint disease), lumbosacral     MAUREEN (generalized anxiety disorder)        Past Surgical History:   Procedure Laterality Date    CARPAL TUNNEL RELEASE Bilateral 2010    HYSTERECTOMY  2011    TONSILLECTOMY  1968       Family History   Problem Relation Age of Onset    Alzheimer's disease Mother     Diabetes Father     Heart disease Father     Heart attack Paternal Grandfather        Social History     Tobacco Use   Smoking Status Never   Smokeless Tobacco Never       Social History     Substance and Sexual Activity   Alcohol Use No         Review of Systems:   Review of Systems   Cardiovascular:  Positive for chest pain. Negative for dyspnea on exertion, leg swelling and syncope.   Respiratory:  Negative for shortness of breath.   "  Gastrointestinal:  Negative for nausea and vomiting.            Vital Sign Min/Max for last 24 hours  Temp  Min: 98.2 °F (36.8 °C)  Max: 98.2 °F (36.8 °C)   BP  Min: 127/78  Max: 167/93   Pulse  Min: 54  Max: 73   Resp  Min: 18  Max: 18   SpO2  Min: 97 %  Max: 100 %   No data recorded    No intake or output data in the 24 hours ending 03/26/24 0854        Telemetry: NSR    Vitals and nursing note reviewed.   Constitutional:       Appearance: Healthy appearance. Not in distress.   Eyes:      Conjunctiva/sclera: Conjunctivae normal.   HENT:    Mouth/Throat:      Dentition: Normal.      Pharynx: Oropharynx is clear.   Neck:      Vascular: JVD normal.   Pulmonary:      Effort: Pulmonary effort is normal.      Breath sounds: Normal breath sounds.   Cardiovascular:      PMI at left midclavicular line. Normal rate. Regular rhythm.      Murmurs: There is no murmur.      No gallop.  No click. No rub.   Pulses:     Intact distal pulses.   Edema:     Peripheral edema absent.   Musculoskeletal: Normal range of motion.      Cervical back: Normal range of motion and neck supple. Skin:     General: Skin is warm and dry.   Neurological:      Mental Status: Alert and oriented to person, place and time.              DATA REVIEW:    EKG (3/26/2024):    Vent. Rate :  51 BPM     Atrial Rate :  51 BPM     P-R Int : 158 ms          QRS Dur :  90 ms      QT Int : 450 ms       P-R-T Axes :  20  12   5 degrees     QTc Int : 414 ms     Sinus bradycardia  Otherwise normal ECG  When compared with ECG of 25-MAR-2024 23:34,  QRS axis shifted left  T wave inversion no longer evident in Lateral leads  Confirmed by MD DELFINO, GIL ([id number]) on 3/26/2024 1:31:12 AM     ECHO: pending      Results from last 7 days   Lab Units 03/25/24  2341   SODIUM mmol/L 146*   POTASSIUM mmol/L 4.1   CHLORIDE mmol/L 109*   BUN mg/dL 9   CREATININE mg/dL 0.85     No results found for: \"PHOS\", \"MG\"  Results from last 7 days   Lab Units 03/26/24  0312 03/25/24  2341 "   HSTROP T ng/L 28* 25*     Results from last 7 days   Lab Units 03/25/24  2341   WBC 10*3/mm3 5.51   HEMOGLOBIN g/dL 14.2   HEMATOCRIT % 42.8   PLATELETS 10*3/mm3 143     Lab Results   Component Value Date    HGBA1C 5.30 02/11/2019     Lab Results   Component Value Date    CHOL 222 (H) 07/09/2019    TRIG 75 07/09/2019    HDL 61 (H) 07/09/2019     (H) 07/09/2019              Chest pain  HS troponin 25--> 28  EKG negative for acute ischemic changes.  TTE normal.  Hyperlipidemia  .  Not on statin.               Given chest pain with cardiac risk factors and indeterminate, flat troponins, will plan for stress PET with myocardial perfusion to rule out ischemia.  If normal, patient can likely be discharged home from a cardiovascular standpoint.  Keep n.p.o., no caffeine.  Obtain consent.  Agree with echocardiogram, await read  Further recommendations to follow procedure.    LARS Street obtained past medical, family history, social history, review of systems, and functioned as a scribe for the remainder of the dictation for Dr. Regalado.      Electronically signed by LARS Street, 03/26/24, 8:54 AM EDT.      I have seen, discussed and evaluated Nemo Guillermo with LARS Street.     57 y/o F with minimal medical history who developed chest discomfort while driving home from [place], [place]. Calm and unremarkable exam. Lab evaluation notable for HsTrop 25-->28, EKG non-ischemic. Given minimal risk factors and mild, flat troponins will plan for stress PET to evaluate for ischemia. If normal, could be discharged from a cardiology perspective if echocardiogram is reassuring. If abnormal, will need [facility]. This was discussed with her and her  at bedside.    Sean Regalado MD, [facility]  Interventional Cardiology  [facility]

## 2024-03-26 NOTE — ED PROVIDER NOTES
Subjective   History of Present Illness  Presents for evaluation of pressure sensation in the mid chest that started several hours ago and was persistent through her car ride back from [place].  By the time of arrival in the emergency room patient's symptoms have resolved.  She does not have any nausea, vomiting, occultly breathing at this time though did have some difficulty breathing earlier.  She has a history of 1 episode that was similar in the distant past about 8 years ago, was seen by cardiologist and told her stress test looked good at that time.  Patient particularly worried given a family history of cardiovascular disease    History provided by:  Patient      Review of Systems    Past Medical History:   Diagnosis Date    DJD (degenerative joint disease), lumbosacral     MAUREEN (generalized anxiety disorder)        No Known Allergies    Past Surgical History:   Procedure Laterality Date    CARPAL TUNNEL RELEASE Bilateral 2010    HYSTERECTOMY  2011    TONSILLECTOMY  1968       Family History   Problem Relation Age of Onset    Alzheimer's disease Mother     Diabetes Father     Heart disease Father     Heart attack Paternal Grandfather        Social History     Socioeconomic History    Marital status:      Spouse name: Robel    Number of children: 2    Years of education: College    Highest education level: Bachelor's degree (e.g., BA, AB, BS)   Tobacco Use    Smoking status: Never    Smokeless tobacco: Never   Substance and Sexual Activity    Alcohol use: No    Drug use: No    Sexual activity: Yes     Partners: Male           Objective   Physical Exam  Constitutional:       General: She is not in acute distress.  HENT:      Head: Normocephalic and atraumatic.   Eyes:      Conjunctiva/sclera: Conjunctivae normal.      Pupils: Pupils are equal, round, and reactive to light.   Cardiovascular:      Rate and Rhythm: Normal rate and regular rhythm.      Pulses: Normal pulses.      Heart sounds: No murmur  heard.     No gallop.   Pulmonary:      Effort: Pulmonary effort is normal. No respiratory distress.   Chest:      Chest wall: No tenderness.   Abdominal:      General: Abdomen is flat. There is no distension.      Tenderness: There is no abdominal tenderness.   Musculoskeletal:         General: No swelling or deformity. Normal range of motion.      Right lower leg: No edema.      Left lower leg: No edema.   Skin:     General: Skin is warm and dry.      Capillary Refill: Capillary refill takes less than 2 seconds.   Neurological:      General: No focal deficit present.      Mental Status: She is alert and oriented to person, place, and time.   Psychiatric:         Mood and Affect: Mood normal.         Behavior: Behavior normal.         Procedures           ED Course  ED Course as of 03/26/24 0415   Mon Mar 25, 2024   2336 Twelve-lead ECG independently interpreted by myself demonstrates sinus bradycardia with a rate of 57, no ST segment elevation or depression.  T wave inversion isolated to aVL [KB]   Tue Mar 26, 2024   0414 Laboratory workup independently interpreted by myself shows negative D-dimer, elevated troponin with stable 2-hour delta though slightly uptrending [KB]   0414 Chest x-ray independently interpreted by myself demonstrates no acute cardiopulmonary abnormality [KB]      ED Course User Index  [KB] Rodger Callejas MD                HEART Score: 4                              Medical Decision Making  Differential diagnosis includes pneumonia, pneumothorax, pulmonary embolism, acute coronary syndrome, other.  Lab and imaging studies were conducted.  Aspirin 324 mg was given.    Patient was reevaluated on multiple occasions in the ER.  On 1 occasion her chest pressure returns and then resolves about 15 minutes later.    Patient's heart score is equal to 4.  Shared decision making was utilized regarding inpatient versus outpatient management and we will ultimately plan to admit the patient for cardiology  consultation and further diagnostic workup.    Problems Addressed:  Chest pain in adult: complicated acute illness or injury    Amount and/or Complexity of Data Reviewed  Independent Historian: spouse     Details: Few details of HPI given by the patient's spouse  External Data Reviewed: notes.     Details: 8/30/2017 reviewed stress test report demonstrating a normal study.  Labs: ordered. Decision-making details documented in ED Course.  Radiology: ordered and independent interpretation performed. Decision-making details documented in ED Course.  ECG/medicine tests: ordered and independent interpretation performed. Decision-making details documented in ED Course.  Discussion of management or test interpretation with external provider(s): Hospital medicine consulted for admission    Risk  OTC drugs.  Prescription drug management.  Decision regarding hospitalization.        Final diagnoses:   Chest pain in adult       ED Disposition  ED Disposition       ED Disposition   Decision to Admit    Condition   --    Comment   Level of Care: Telemetry [5]   Diagnosis: Chest pain [549079]   Admitting Physician: JULIO LEAL III [[id number]]   Attending Physician: JULIO LEAL III [[id number]]   Bed Request Comments: tele obs (CDU)               Recent Results (from the past 24 hour(s))   ECG 12 Lead ED Triage Standing Order; Chest Pain    Collection Time: 03/25/24 11:34 PM   Result Value Ref Range    QT Interval 436 ms    QTC Interval 424 ms   High Sensitivity Troponin T    Collection Time: 03/25/24 11:41 PM    Specimen: Arm, Right; Blood   Result Value Ref Range    HS Troponin T 25 (H) <14 ng/L   Comprehensive Metabolic Panel    Collection Time: 03/25/24 11:41 PM    Specimen: Arm, Right; Blood   Result Value Ref Range    Glucose 99 65 - 99 mg/dL    BUN 9 6 - 20 mg/dL    Creatinine 0.85 0.57 - 1.00 mg/dL    Sodium 146 (H) 136 - 145 mmol/L    Potassium 4.1 3.5 - 5.2 mmol/L    Chloride 109 (H) 98 - 107 mmol/L     CO2 27.0 22.0 - 29.0 mmol/L    Calcium 9.0 8.6 - 10.5 mg/dL    Total Protein 6.9 6.0 - 8.5 g/dL    Albumin 4.6 3.5 - 5.2 g/dL    ALT (SGPT) 42 (H) 1 - 33 U/L    AST (SGOT) 41 (H) 1 - 32 U/L    Alkaline Phosphatase 65 39 - 117 U/L    Total Bilirubin 0.3 0.0 - 1.2 mg/dL    Globulin 2.3 gm/dL    A/G Ratio 2.0 g/dL    BUN/Creatinine Ratio 10.6 7.0 - 25.0    Anion Gap 10.0 5.0 - 15.0 mmol/L    eGFR 79.5 >60.0 mL/min/1.73   Lipase    Collection Time: 03/25/24 11:41 PM    Specimen: Arm, Right; Blood   Result Value Ref Range    Lipase 29 13 - 60 U/L   BNP    Collection Time: 03/25/24 11:41 PM    Specimen: Arm, Right; Blood   Result Value Ref Range    proBNP 38.8 0.0 - 900.0 pg/mL   Green Top (Gel)    Collection Time: 03/25/24 11:41 PM   Result Value Ref Range    Extra Tube Hold for add-ons.    Lavender Top    Collection Time: 03/25/24 11:41 PM   Result Value Ref Range    Extra Tube hold for add-on    Gold Top - SST    Collection Time: 03/25/24 11:41 PM   Result Value Ref Range    Extra Tube Hold for add-ons.    Gray Top    Collection Time: 03/25/24 11:41 PM   Result Value Ref Range    Extra Tube Hold for add-ons.    Light Blue Top    Collection Time: 03/25/24 11:41 PM   Result Value Ref Range    Extra Tube Hold for add-ons.    CBC Auto Differential    Collection Time: 03/25/24 11:41 PM    Specimen: Arm, Right; Blood   Result Value Ref Range    WBC 5.51 3.40 - 10.80 10*3/mm3    RBC 4.52 3.77 - 5.28 10*6/mm3    Hemoglobin 14.2 12.0 - 15.9 g/dL    Hematocrit 42.8 34.0 - 46.6 %    MCV 94.7 79.0 - 97.0 fL    MCH 31.4 26.6 - 33.0 pg    MCHC 33.2 31.5 - 35.7 g/dL    RDW 12.6 12.3 - 15.4 %    RDW-SD 43.5 37.0 - 54.0 fl    MPV 10.4 6.0 - 12.0 fL    Platelets 143 140 - 450 10*3/mm3    Neutrophil % 43.2 42.7 - 76.0 %    Lymphocyte % 46.1 (H) 19.6 - 45.3 %    Monocyte % 6.7 5.0 - 12.0 %    Eosinophil % 3.1 0.3 - 6.2 %    Basophil % 0.5 0.0 - 1.5 %    Immature Grans % 0.4 0.0 - 0.5 %    Neutrophils, Absolute 2.38 1.70 - 7.00 10*3/mm3     Lymphocytes, Absolute 2.54 0.70 - 3.10 10*3/mm3    Monocytes, Absolute 0.37 0.10 - 0.90 10*3/mm3    Eosinophils, Absolute 0.17 0.00 - 0.40 10*3/mm3    Basophils, Absolute 0.03 0.00 - 0.20 10*3/mm3    Immature Grans, Absolute 0.02 0.00 - 0.05 10*3/mm3    nRBC 0.0 0.0 - 0.2 /100 WBC   D-dimer, Quantitative    Collection Time: 03/25/24 11:41 PM    Specimen: Arm, Right; Blood   Result Value Ref Range    D-Dimer, Quantitative <0.27 0.00 - 0.58 MCGFEU/mL   ECG 12 Lead Chest Pain    Collection Time: 03/26/24  1:22 AM   Result Value Ref Range    QT Interval 450 ms    QTC Interval 414 ms   High Sensitivity Troponin T 2Hr    Collection Time: 03/26/24  3:12 AM    Specimen: Blood   Result Value Ref Range    HS Troponin T 28 (H) <14 ng/L    Troponin T Delta 3 >=-4 - <+4 ng/L     Note: In addition to lab results from this visit, the labs listed above may include labs taken at another facility or during a different encounter within the last 24 hours. Please correlate lab times with ED admission and discharge times for further clarification of the services performed during this visit.    XR Chest 1 View   Final Result   Impression:   No acute cardiopulmonary abnormality.            Electronically Signed: Panchito Harkins MD     3/26/2024 12:18 AM EDT     Workstation ID: [id number]        Vitals:    03/26/24 0130 03/26/24 0204 03/26/24 0229 03/26/24 0300   BP: 147/85 161/80 139/83 135/83   Pulse: 55 60 54 57   Resp:       Temp:       TempSrc:       SpO2: 97% 98% 97% 97%   Weight:       Height:         Medications   sodium chloride 0.9 % flush 10 mL (has no administration in time range)   aspirin chewable tablet 324 mg (324 mg Oral Given 3/26/24 0010)     ECG/EMG Results (last 24 hours)       Procedure Component Value Units Date/Time    ECG 12 Lead ED Triage Standing Order; Chest Pain [[id number]] Collected: 03/25/24 2334     Updated: 03/25/24 2339     QT Interval 436 ms      QTC Interval 424 ms     Narrative:      Test Reason : ED Triage  Standing Order~  Blood Pressure :   */*   mmHG  Vent. Rate :  57 BPM     Atrial Rate :  57 BPM     P-R Int : 138 ms          QRS Dur :  90 ms      QT Int : 436 ms       P-R-T Axes :   * 162 161 degrees     QTc Int : 424 ms    Sinus bradycardia  Right axis deviation  Abnormal ECG  When compared with ECG of 04-JUN-2015 02:49,  QRS axis shifted right  ST no longer elevated in Lateral leads  T wave inversion now evident in Lateral leads  Confirmed by MD SALEH KYLE ([id number]) on 3/25/2024 11:39:26 PM    Referred By:            Confirmed By: GIL SALEH MD    ECG 12 Lead Chest Pain [[id number]] Collected: 03/26/24 0122     Updated: 03/26/24 0131     QT Interval 450 ms      QTC Interval 414 ms     Narrative:      Test Reason : Chest Pain  Blood Pressure :   */*   mmHG  Vent. Rate :  51 BPM     Atrial Rate :  51 BPM     P-R Int : 158 ms          QRS Dur :  90 ms      QT Int : 450 ms       P-R-T Axes :  20  12   5 degrees     QTc Int : 414 ms    Sinus bradycardia  Otherwise normal ECG  When compared with ECG of 25-MAR-2024 23:34,  QRS axis shifted left  T wave inversion no longer evident in Lateral leads  Confirmed by MD SALEH KYLE ([id number]) on 3/26/2024 1:31:12 AM    Referred By: ED MD           Confirmed By: GIL SALEH MD          ECG 12 Lead Chest Pain   Final Result   Test Reason : Chest Pain   Blood Pressure :   */*   mmHG   Vent. Rate :  51 BPM     Atrial Rate :  51 BPM      P-R Int : 158 ms          QRS Dur :  90 ms       QT Int : 450 ms       P-R-T Axes :  20  12   5 degrees      QTc Int : 414 ms      Sinus bradycardia   Otherwise normal ECG   When compared with ECG of 25-MAR-2024 23:34,   QRS axis shifted left   T wave inversion no longer evident in Lateral leads   Confirmed by MD SALEH KYLE ([id number]) on 3/26/2024 1:31:12 AM      Referred By: ARELIS TORO           Confirmed By: GIL SALEH MD      ECG 12 Lead ED Triage Standing Order; Chest Pain   Final Result   Test Reason : ED Triage Standing Order~   Blood  Pressure :   */*   mmHG   Vent. Rate :  57 BPM     Atrial Rate :  57 BPM      P-R Int : 138 ms          QRS Dur :  90 ms       QT Int : 436 ms       P-R-T Axes :   * 162 161 degrees      QTc Int : 424 ms      Sinus bradycardia   Right axis deviation   Abnormal ECG   When compared with ECG of 04-JUN-2015 02:49,   QRS axis shifted right   ST no longer elevated in Lateral leads   T wave inversion now evident in Lateral leads   Confirmed by MD SALEH KYLE ([id number]) on 3/25/2024 11:39:26 PM      Referred By:            Confirmed By: GIL SALEH MD      ECG 12 Lead ED Triage Standing Order; Chest Pain    (Results Pending)           No follow-up provider specified.       Medication List      No changes were made to your prescriptions during this visit.            Gil Saleh MD  03/26/24 0418

## 2024-03-26 NOTE — PROGRESS NOTES
Brief cardiology note:    Mrs. Guillermo underwent an echocardiogram today which was unremarkable. Stress PET was indeterminate due to artifact but possibly shows a pattern of anterior ischemia. Discussed with Mrs. Guillermo and her  at bedside the options for proceeding, including medical therapy vs left heart catheterization with coronary angiogram. The risks, benefits and alternatives to each approach were discussed. She would like to proceed with angiogram tomorrow. This will be arranged.    Continue ASA  Holding BB with Hrs in the low 60s  NPO at midnight  [facility] tomorrow  Obtain malorie Regalado MD, FACC  Interventional Cardiology  [facility]

## 2024-03-26 NOTE — PROGRESS NOTES
[facility] Medicine Services  PROGRESS NOTE    Patient Name: Nemo Guillermo  : 1965  MRN: [id number]    Date of Admission: 3/25/2024  Primary Care Physician: Maribel Garrido MD    Subjective   Subjective     CC: Chest pain    HPI: No dyspnea/pain/tightness, currently      Objective   Objective     Vital Signs:   Temp:  [98.2 °F (36.8 °C)] 98.2 °F (36.8 °C)  Heart Rate:  [54-73] 73  Resp:  [18] 18  BP: (127-167)/(74-94) 127/78     Physical Exam:  NAD, alert and oriented  OP clear, dry MM  Neck supple  No LAD  RRR  CTAB  +BS, ND, NT, soft  GREGG  Normal affect  No rashes    Results Reviewed:  LAB RESULTS:      Lab 24  2341   WBC 5.51   HEMOGLOBIN 14.2   HEMATOCRIT 42.8   PLATELETS 143   NEUTROS ABS 2.38   IMMATURE GRANS (ABS) 0.02   LYMPHS ABS 2.54   MONOS ABS 0.37   EOS ABS 0.17   MCV 94.7   D DIMER QUANT <0.27         Lab 24  2341   SODIUM 146*   POTASSIUM 4.1   CHLORIDE 109*   CO2 27.0   ANION GAP 10.0   BUN 9   CREATININE 0.85   EGFR 79.5   GLUCOSE 99   CALCIUM 9.0         Lab 24  2341   TOTAL PROTEIN 6.9   ALBUMIN 4.6   GLOBULIN 2.3   ALT (SGPT) 42*   AST (SGOT) 41*   BILIRUBIN 0.3   ALK PHOS 65   LIPASE 29         Lab 24  0312 24  2341   PROBNP  --  38.8   HSTROP T 28* 25*                 Brief Urine Lab Results       None            Microbiology Results Abnormal       None            XR Chest 1 View    Result Date: 3/26/2024  XR CHEST 1 VW Date of Exam: 3/25/2024 11:55 PM EDT Indication: Chest Pain Triage Protocol Comparison: 2015. Findings: There is no pneumothorax, pleural effusion or focal airspace consolidation. Heart size and pulmonary vasculature appear within normal limits. Regional bones appear intact.     Impression: Impression: No acute cardiopulmonary abnormality. Electronically Signed: Panchito Harkins MD  3/26/2024 12:18 AM EDT  Workstation ID: [id number]         Current medications:  Scheduled Meds:aspirin, 81 mg, Oral,  Daily  heparin (porcine), 5,000 Units, Subcutaneous, Q8H  sodium chloride, 10 mL, Intravenous, Q12H      Continuous Infusions:   PRN Meds:.  acetaminophen    melatonin    nitroglycerin    ondansetron ODT **OR** ondansetron    sodium chloride    sodium chloride    sodium chloride    Assessment & Plan   Assessment & Plan     Active Hospital Problems    Diagnosis  POA    **Chest pain [R07.9]  Yes      Resolved Hospital Problems   No resolved problems to display.        Brief Hospital Course to date:  Nemo Guillermo is a 58 y.o. female with history of anxiety here for chest pain    Dyspnea/chest pain  -EKG reviewed, mild troponin elevation, with mild increase  -cardiology consulted  -ECHO pending      Expected Discharge Location and Transportation: Home  Expected Discharge   Expected Discharge Date: 3/27/2024; Expected Discharge Time:      DVT prophylaxis:  Medical DVT prophylaxis orders are present.         AM-PAC 6 Clicks Score (PT): 24 (03/26/24 3904)    CODE STATUS:   Code Status and Medical Interventions:   Ordered at: 03/26/24 0502     Code Status (Patient has no pulse and is not breathing):    CPR (Attempt to Resuscitate)     Medical Interventions (Patient has pulse or is breathing):    Full Support       Jason Mcfadden MD  03/26/24

## 2024-03-26 NOTE — H&P
[facility] Medicine Services  HISTORY AND PHYSICAL    Patient Name: Nemo Guillermo  : 1965  MRN: [id number]  Primary Care Physician: Maribel Garrido MD  Date of admission: 3/25/2024    Subjective   Subjective     Chief Complaint:  Chest pain     HPI:  Nemo Guillermo is a 58 y.o. female with a medical history only significant for anxiety who presents to [facility] ED for complaint of chest pain. She states that yesterday while riding in the car, she began having substernal chest pressure. She states that this waxed and waned until presenting here tonight. She denies fever, chills, SOB, abdominal pain, nausea or vomiting. She reports no prior cardiac history, but does note having a similar episode of chest pain some time ago, but was thought to be due to anxiety.         Review of Systems   Constitutional:  Negative for chills, fatigue, fever and unexpected weight change.   HENT:  Negative for nosebleeds, sore throat and trouble swallowing.    Eyes:  Negative for photophobia and visual disturbance.   Respiratory:  Negative for cough, shortness of breath and wheezing.    Cardiovascular:  Positive for chest pain. Negative for palpitations.   Gastrointestinal:  Negative for abdominal pain, diarrhea, nausea and vomiting.   Genitourinary:  Negative for dysuria and hematuria.   Musculoskeletal:  Negative for arthralgias and myalgias.   Skin: Negative.    Neurological:  Negative for tremors, syncope, speech difficulty and weakness.   Psychiatric/Behavioral:  Negative for confusion. The patient is not nervous/anxious.               Personal History     Past Medical History:   Diagnosis Date    DJD (degenerative joint disease), lumbosacral     MAUREEN (generalized anxiety disorder)              Past Surgical History:   Procedure Laterality Date    CARPAL TUNNEL RELEASE Bilateral 2010    HYSTERECTOMY  2011    TONSILLECTOMY  1968       Family History:  family history includes  Alzheimer's disease in her mother; Diabetes in her father; Heart attack in her paternal grandfather; Heart disease in her father.     Social History:  reports that she has never smoked. She has never used smokeless tobacco. She reports that she does not drink alcohol and does not use drugs.  Social History     Social History Narrative    Not on file       Medications:       No Known Allergies    Objective   Objective     Vital Signs:   Temp:  [98.2 °F (36.8 °C)] 98.2 °F (36.8 °C)  Heart Rate:  [54-66] 62  Resp:  [18] 18  BP: (133-167)/(77-94) 145/86    Physical Exam  Constitutional:       General: She is not in acute distress.     Appearance: Normal appearance.   HENT:      Head: Atraumatic.      Right Ear: External ear normal.      Left Ear: External ear normal.      Nose: Nose normal.   Eyes:      Extraocular Movements: Extraocular movements intact.      Conjunctiva/sclera: Conjunctivae normal.      Pupils: Pupils are equal, round, and reactive to light.   Cardiovascular:      Rate and Rhythm: Regular rhythm. Bradycardia present.      Pulses: Normal pulses.      Heart sounds: Normal heart sounds. No murmur heard.  Pulmonary:      Effort: Pulmonary effort is normal. No respiratory distress.      Breath sounds: Normal breath sounds. No wheezing, rhonchi or rales.   Abdominal:      General: Bowel sounds are normal. There is no distension.      Tenderness: There is no abdominal tenderness. There is no guarding or rebound.   Musculoskeletal:         General: Normal range of motion.      Cervical back: No rigidity.   Skin:     General: Skin is warm and dry.      Coloration: Skin is not jaundiced.      Findings: No lesion or rash.   Neurological:      General: No focal deficit present.      Mental Status: She is alert and oriented to person, place, and time.   Psychiatric:         Attention and Perception: Attention normal.         Mood and Affect: Mood normal.         Behavior: Behavior normal.         Thought Content:  Thought content normal.          Result Review:  I have personally reviewed the results from the time of this admission to 3/26/2024 05:02 EDT and agree with these findings:  [x]  Laboratory list / accordion  []  Microbiology  [x]  Radiology  [x]  EKG/Telemetry   []  Cardiology/Vascular   []  Pathology  [x]  Old records  []  Other:      LAB RESULTS:      Lab 03/25/24  2341   WBC 5.51   HEMOGLOBIN 14.2   HEMATOCRIT 42.8   PLATELETS 143   NEUTROS ABS 2.38   IMMATURE GRANS (ABS) 0.02   LYMPHS ABS 2.54   MONOS ABS 0.37   EOS ABS 0.17   MCV 94.7   D DIMER QUANT <0.27         Lab 03/25/24  2341   SODIUM 146*   POTASSIUM 4.1   CHLORIDE 109*   CO2 27.0   ANION GAP 10.0   BUN 9   CREATININE 0.85   EGFR 79.5   GLUCOSE 99   CALCIUM 9.0         Lab 03/25/24  2341   TOTAL PROTEIN 6.9   ALBUMIN 4.6   GLOBULIN 2.3   ALT (SGPT) 42*   AST (SGOT) 41*   BILIRUBIN 0.3   ALK PHOS 65   LIPASE 29         Lab 03/26/24  0312 03/25/24  2341   PROBNP  --  38.8   HSTROP T 28* 25*                 Brief Urine Lab Results       None          Microbiology Results (last 10 days)       ** No results found for the last 240 hours. **            XR Chest 1 View    Result Date: 3/26/2024  XR CHEST 1 VW Date of Exam: 3/25/2024 11:55 PM EDT Indication: Chest Pain Triage Protocol Comparison: 6/4/2015. Findings: There is no pneumothorax, pleural effusion or focal airspace consolidation. Heart size and pulmonary vasculature appear within normal limits. Regional bones appear intact.     Impression: Impression: No acute cardiopulmonary abnormality. Electronically Signed: Panchito Harkins MD  3/26/2024 12:18 AM EDT  Workstation ID: [id number]         Assessment & Plan   Assessment & Plan       Chest pain      Nemo Guillermo is a 58 y.o. female with a medical history only significant for anxiety who presents to [facility] ED for complaint of chest pain.    Chest pain  -Pain has now resolved.   -Initial HS Troponin elevated at 25, with repeat increasing to  28  -CXR negative for acute findings  -Echo in the am  -Cardiology consult for the am  -Lipid panel and [id number] the am  -Monitor on tele        DVT prophylaxis:  Heparin subQ    CODE STATUS:  Full Code  Code Status (Patient has no pulse and is not breathing): CPR (Attempt to Resuscitate)  Medical Interventions (Patient has pulse or is breathing): Full Support      Expected Discharge 1-2 days    Signature: Electronically signed by Linda Sue PA-C, 03/26/24, 4:58 AM EDT    ------------------------    The patient was seen independently by the APC.  I was available for any questions or concerns.     Electronically signed by Tal Mcnamara III, DO, 03/26/24, 5:05 AM EDT.

## 2024-03-26 NOTE — CASE MANAGEMENT/SOCIAL WORK
Continued Stay Note  [facility]     Patient Name: Nemo Guillermo  MRN: [id number]  Today's Date: 3/26/2024    Admit Date: 3/25/2024    Plan: Home at DC   Discharge Plan       Row Name 03/26/24 1003       Plan    Plan Home at DC    Plan Comments No DC needs identified at this time.    Final Discharge Disposition Code 01 - home or self-care      Row Name 03/26/24 0827       Plan    Final Discharge Disposition Code 01 - home or self-care                   Discharge Codes    No documentation.                 Expected Discharge Date and Time       Expected Discharge Date Expected Discharge Time    Mar 27, 2024               Dilcia Clay RN

## 2024-03-26 NOTE — ED NOTES
Nemo Guillermo    Nursing Report ED to Floor:  Mental status: AOx4  Ambulatory status: Independent  Oxygen Therapy:  RA  Cardiac Rhythm: SB  Admitted from: Home  Safety Concerns:  Falls  Social Issues: unknown  ED Room #:  05    ED Nurse Phone Extension - [phone] or may call [phone].      HPI:   Chief Complaint   Patient presents with    Chest Pain       Past Medical History:  Past Medical History:   Diagnosis Date    DJD (degenerative joint disease), lumbosacral     MAUREEN (generalized anxiety disorder)         Past Surgical History:  Past Surgical History:   Procedure Laterality Date    CARPAL TUNNEL RELEASE Bilateral 2010    HYSTERECTOMY  2011    TONSILLECTOMY  1968        Admitting Doctor:   Tal Mcnamara III, DO    Consulting Provider(s):  Consults       No orders found for last 30 day(s).             Admitting Diagnosis:   The encounter diagnosis was Chest pain in adult.    Most Recent Vitals:   Vitals:    03/26/24 0229 03/26/24 0300 03/26/24 0328 03/26/24 0358   BP: 139/83 135/83 133/77 145/79   Pulse: 54 57 56 56   Resp:       Temp:       TempSrc:       SpO2: 97% 97% 97% 97%   Weight:       Height:           Active LDAs/IV Access:   Lines, Drains & Airways       Active LDAs       Name Placement date Placement time Site Days    Peripheral IV 03/26/24 0448 Left Antecubital 03/26/24 0448  Antecubital  less than 1                    Labs (abnormal labs have a star):   Labs Reviewed   TROPONIN - Abnormal; Notable for the following components:       Result Value    HS Troponin T 25 (*)     All other components within normal limits    Narrative:     High Sensitive Troponin T Reference Range:  <14.0 ng/L- Negative Female for AMI  <22.0 ng/L- Negative Male for AMI  >=14 - Abnormal Female indicating possible myocardial injury.  >=22 - Abnormal Male indicating possible myocardial injury.   Clinicians would have to utilize clinical acumen, EKG, Troponin, and serial changes to determine if it is an Acute Myocardial  Infarction or myocardial injury due to an underlying chronic condition.        COMPREHENSIVE METABOLIC PANEL - Abnormal; Notable for the following components:    Sodium 146 (*)     Chloride 109 (*)     ALT (SGPT) 42 (*)     AST (SGOT) 41 (*)     All other components within normal limits    Narrative:     GFR Normal >60  Chronic Kidney Disease <60  Kidney Failure <15     CBC WITH AUTO DIFFERENTIAL - Abnormal; Notable for the following components:    Lymphocyte % 46.1 (*)     All other components within normal limits   HIGH SENSITIVITIY TROPONIN T 2HR - Abnormal; Notable for the following components:    HS Troponin T 28 (*)     All other components within normal limits    Narrative:     High Sensitive Troponin T Reference Range:  <14.0 ng/L- Negative Female for AMI  <22.0 ng/L- Negative Male for AMI  >=14 - Abnormal Female indicating possible myocardial injury.  >=22 - Abnormal Male indicating possible myocardial injury.   Clinicians would have to utilize clinical acumen, EKG, Troponin, and serial changes to determine if it is an Acute Myocardial Infarction or myocardial injury due to an underlying chronic condition.        LIPASE - Normal   BNP (IN-HOUSE) - Normal    Narrative:     This assay is used as an aid in the diagnosis of individuals suspected of having heart failure. It can be used as an aid in the diagnosis of acute decompensated heart failure (ADHF) in patients presenting with signs and symptoms of ADHF to the emergency department (ED). In addition, NT-proBNP of <300 pg/mL indicates ADHF is not likely.    Age Range Result Interpretation  NT-proBNP Concentration (pg/mL:      <50             Positive            >450                   Gray                 300-450                    Negative             <300    50-75           Positive            >900                  Gray                300-900                  Negative            <300      >75             Positive            >1800                  [place]        "         300-1800                  Negative            <300   D-DIMER, QUANTITATIVE - Normal    Narrative:     According to the assay 's published package insert, a normal (<0.50 MCGFEU/mL) D-dimer result in conjunction with a non-high clinical probability assessment, excludes deep vein thrombosis (DVT) and pulmonary embolism (PE) with high sensitivity.    D-dimer values increase with age and this can make VTE exclusion of an older population difficult. To address this, the [employer], based on best available evidence and recent guidelines, recommends that clinicians use age-adjusted D-dimer thresholds in patients greater than 50 years of age with: a) a low probability of PE who do not meet all Pulmonary Embolism Rule Out Criteria, or b) in those with intermediate probability of PE.   The formula for an age-adjusted D-dimer cut-off is \"age/100\".  For example, a 60 year old patient would have an age-adjusted cut-off of 0.60 MCGFEU/mL and an 80 year old 0.80 MCGFEU/mL.   RAINBOW DRAW    Narrative:     The following orders were created for panel order [place] Draw.  Procedure                               Abnormality         Status                     ---------                               -----------         ------                     Green Top (Gel)[[id number]]                                  Final result               Lavender Top[[id number]]                                     Final result               Gold Top - SST[[id number]]                                   Final result               [place] Top[[id number]]                                         Final result               Light Blue Top[[id number]]                                   Final result                 Please view results for these tests on the individual orders.   CBC AND DIFFERENTIAL    Narrative:     The following orders were created for panel order CBC & Differential.  Procedure                               Abnormality  "        Status                     ---------                               -----------         ------                     CBC Auto Differential[[id number]]        Abnormal            Final result                 Please view results for these tests on the individual orders.   GREEN TOP   LAVENDER TOP   GOLD TOP - SST   GRAY TOP   LIGHT BLUE TOP       Meds Given in ED:   Medications   sodium chloride 0.9 % flush 10 mL (has no administration in time range)   aspirin chewable tablet 324 mg (324 mg Oral Given 3/26/24 0010)           Last NIH score:                                                          Dysphagia screening results:  Patient Factors Component (Dysphagia:Stroke or Rule-out)  Best Eye Response: 4-->(E4) spontaneous (03/26/24 0004)  Best Motor Response: 6-->(M6) obeys commands (03/26/24 0004)  Best Verbal Response: 5-->(V5) oriented (03/26/24 0004)  Cheng Coma Scale Score: 15 (03/26/24 0004)     Cheng Coma Scale:  No data recorded     CIWA:        Restraint Type:            Isolation Status:  No active isolations

## 2024-03-26 NOTE — PLAN OF CARE
Goal Outcome Evaluation:      Patient stable during shift; VSS and afebrile. No complaints of chest pain during shift. ECHO and stress test completed today. NPO at midnight tonight for [facility] 3/27. Consent is on the chart. Patient independent in the room with call light in reach. Plan of care ongoing.

## 2024-03-27 VITALS
BODY MASS INDEX: 32.18 KG/M2 | HEART RATE: 77 BPM | SYSTOLIC BLOOD PRESSURE: 121 MMHG | WEIGHT: 205.03 LBS | RESPIRATION RATE: 18 BRPM | OXYGEN SATURATION: 94 % | HEIGHT: 67 IN | TEMPERATURE: 98.2 F | DIASTOLIC BLOOD PRESSURE: 73 MMHG

## 2024-03-27 PROBLEM — R07.9 CHEST PAIN: Status: RESOLVED | Noted: 2024-03-26 | Resolved: 2024-03-27

## 2024-03-27 PROBLEM — R07.2 PRECORDIAL CHEST PAIN: Status: RESOLVED | Noted: 2024-03-27 | Resolved: 2024-03-27

## 2024-03-27 PROBLEM — R07.2 PRECORDIAL CHEST PAIN: Status: ACTIVE | Noted: 2024-03-27

## 2024-03-27 LAB
ANION GAP SERPL CALCULATED.3IONS-SCNC: 8 MMOL/L (ref 5–15)
BASOPHILS # BLD AUTO: 0.03 10*3/MM3 (ref 0–0.2)
BASOPHILS NFR BLD AUTO: 0.6 % (ref 0–1.5)
BUN SERPL-MCNC: 20 MG/DL (ref 6–20)
BUN/CREAT SERPL: 21.5 (ref 7–25)
CALCIUM SPEC-SCNC: 8.4 MG/DL (ref 8.6–10.5)
CHLORIDE SERPL-SCNC: 109 MMOL/L (ref 98–107)
CO2 SERPL-SCNC: 24 MMOL/L (ref 22–29)
CREAT SERPL-MCNC: 0.93 MG/DL (ref 0.57–1)
DEPRECATED RDW RBC AUTO: 41.8 FL (ref 37–54)
EGFRCR SERPLBLD CKD-EPI 2021: 71.4 ML/MIN/1.73
EOSINOPHIL # BLD AUTO: 0.15 10*3/MM3 (ref 0–0.4)
EOSINOPHIL NFR BLD AUTO: 2.8 % (ref 0.3–6.2)
ERYTHROCYTE [DISTWIDTH] IN BLOOD BY AUTOMATED COUNT: 12.4 % (ref 12.3–15.4)
GLUCOSE SERPL-MCNC: 102 MG/DL (ref 65–99)
HCT VFR BLD AUTO: 39.5 % (ref 34–46.6)
HGB BLD-MCNC: 13 G/DL (ref 12–15.9)
IMM GRANULOCYTES # BLD AUTO: 0.01 10*3/MM3 (ref 0–0.05)
IMM GRANULOCYTES NFR BLD AUTO: 0.2 % (ref 0–0.5)
LYMPHOCYTES # BLD AUTO: 2.44 10*3/MM3 (ref 0.7–3.1)
LYMPHOCYTES NFR BLD AUTO: 46 % (ref 19.6–45.3)
MAGNESIUM SERPL-MCNC: 2.2 MG/DL (ref 1.6–2.6)
MCH RBC QN AUTO: 30.3 PG (ref 26.6–33)
MCHC RBC AUTO-ENTMCNC: 32.9 G/DL (ref 31.5–35.7)
MCV RBC AUTO: 92.1 FL (ref 79–97)
MONOCYTES # BLD AUTO: 0.4 10*3/MM3 (ref 0.1–0.9)
MONOCYTES NFR BLD AUTO: 7.5 % (ref 5–12)
NEUTROPHILS NFR BLD AUTO: 2.27 10*3/MM3 (ref 1.7–7)
NEUTROPHILS NFR BLD AUTO: 42.9 % (ref 42.7–76)
NRBC BLD AUTO-RTO: 0 /100 WBC (ref 0–0.2)
PLATELET # BLD AUTO: 127 10*3/MM3 (ref 140–450)
PMV BLD AUTO: 10.3 FL (ref 6–12)
POTASSIUM SERPL-SCNC: 4.6 MMOL/L (ref 3.5–5.2)
RBC # BLD AUTO: 4.29 10*6/MM3 (ref 3.77–5.28)
SODIUM SERPL-SCNC: 141 MMOL/L (ref 136–145)
WBC NRBC COR # BLD AUTO: 5.3 10*3/MM3 (ref 3.4–10.8)

## 2024-03-27 PROCEDURE — 99238 HOSP IP/OBS DSCHRG MGMT 30/<: CPT | Performed by: INTERNAL MEDICINE

## 2024-03-27 PROCEDURE — G0378 HOSPITAL OBSERVATION PER HR: HCPCS

## 2024-03-27 PROCEDURE — 85025 COMPLETE CBC W/AUTO DIFF WBC: CPT | Performed by: PHYSICIAN ASSISTANT

## 2024-03-27 PROCEDURE — 25010000002 MIDAZOLAM PER 1 MG: Performed by: INTERNAL MEDICINE

## 2024-03-27 PROCEDURE — 83735 ASSAY OF MAGNESIUM: CPT | Performed by: PHYSICIAN ASSISTANT

## 2024-03-27 PROCEDURE — 25510000001 IOPAMIDOL PER 1 ML: Performed by: INTERNAL MEDICINE

## 2024-03-27 PROCEDURE — 93458 L HRT ARTERY/VENTRICLE ANGIO: CPT | Performed by: INTERNAL MEDICINE

## 2024-03-27 PROCEDURE — 80048 BASIC METABOLIC PNL TOTAL CA: CPT | Performed by: PHYSICIAN ASSISTANT

## 2024-03-27 PROCEDURE — 25010000002 HEPARIN (PORCINE) PER 1000 UNITS: Performed by: PHYSICIAN ASSISTANT

## 2024-03-27 PROCEDURE — 25010000002 FENTANYL CITRATE (PF) 50 MCG/ML SOLUTION: Performed by: INTERNAL MEDICINE

## 2024-03-27 PROCEDURE — 96372 THER/PROPH/DIAG INJ SC/IM: CPT

## 2024-03-27 PROCEDURE — C1769 GUIDE WIRE: HCPCS | Performed by: INTERNAL MEDICINE

## 2024-03-27 PROCEDURE — 99232 SBSQ HOSP IP/OBS MODERATE 35: CPT | Performed by: INTERNAL MEDICINE

## 2024-03-27 PROCEDURE — 25010000002 HEPARIN (PORCINE) PER 1000 UNITS: Performed by: INTERNAL MEDICINE

## 2024-03-27 PROCEDURE — C1894 INTRO/SHEATH, NON-LASER: HCPCS | Performed by: INTERNAL MEDICINE

## 2024-03-27 RX ORDER — LIDOCAINE HYDROCHLORIDE 10 MG/ML
INJECTION, SOLUTION EPIDURAL; INFILTRATION; INTRACAUDAL; PERINEURAL
Status: DISCONTINUED | OUTPATIENT
Start: 2024-03-27 | End: 2024-03-27 | Stop reason: HOSPADM

## 2024-03-27 RX ORDER — HEPARIN SODIUM 1000 [USP'U]/ML
INJECTION, SOLUTION INTRAVENOUS; SUBCUTANEOUS
Status: DISCONTINUED | OUTPATIENT
Start: 2024-03-27 | End: 2024-03-27 | Stop reason: HOSPADM

## 2024-03-27 RX ORDER — FENTANYL CITRATE 50 UG/ML
INJECTION, SOLUTION INTRAMUSCULAR; INTRAVENOUS
Status: DISCONTINUED | OUTPATIENT
Start: 2024-03-27 | End: 2024-03-27 | Stop reason: HOSPADM

## 2024-03-27 RX ORDER — ROSUVASTATIN CALCIUM 10 MG/1
5 TABLET, COATED ORAL NIGHTLY
Status: DISCONTINUED | OUTPATIENT
Start: 2024-03-27 | End: 2024-03-27 | Stop reason: HOSPADM

## 2024-03-27 RX ORDER — NITROGLYCERIN 0.4 MG/1
0.4 TABLET SUBLINGUAL
Status: DISCONTINUED | OUTPATIENT
Start: 2024-03-27 | End: 2024-03-27 | Stop reason: HOSPADM

## 2024-03-27 RX ORDER — MIDAZOLAM HYDROCHLORIDE 1 MG/ML
INJECTION INTRAMUSCULAR; INTRAVENOUS
Status: DISCONTINUED | OUTPATIENT
Start: 2024-03-27 | End: 2024-03-27 | Stop reason: HOSPADM

## 2024-03-27 RX ADMIN — HEPARIN SODIUM 5000 UNITS: 5000 INJECTION INTRAVENOUS; SUBCUTANEOUS at 06:39

## 2024-03-27 RX ADMIN — ASPIRIN 81 MG: 81 TABLET, COATED ORAL at 10:52

## 2024-03-27 RX ADMIN — Medication 10 ML: at 08:36

## 2024-03-27 NOTE — DISCHARGE SUMMARY
[facility] Medicine Services  DISCHARGE SUMMARY    Patient Name: Nemo Guillermo  : 1965  MRN: [id number]    Date of Admission: 3/25/2024 11:54 PM  Date of Discharge:  3/27/2024  Primary Care Physician: Maribel Garrido MD    Consults       Date and Time Order Name Status Description    3/26/2024  7:40 AM Inpatient Cardiology Consult Completed             Hospital Course     Presenting Problem: precordial chest pain    Active Hospital Problems   No active problems to display.      Resolved Hospital Problems    Diagnosis Date Resolved POA    **Precordial chest pain [R07.2] 2024 Yes    Chest pain [R07.9] 2024 Yes          Hospital Course:  Nemo Guillermo is a 58 y.o. female who presented with waxing/waning substernal chest pressure. Low flat troponins on admission, stress test w significant imaging artifact impairing interpretation so [facility] pursued 3/27 with normal coronary arteries.   Chest pain resolved at time of discharge    Discharge Follow Up Recommendations for outpatient labs/diagnostics:   F/u PCP 1-2 weeks post hospital discharge    Day of Discharge     HPI:   Doing well, chest pain free pre-[facility], hopeful for home later      Vital Signs:   Temp:  [98.4 °F (36.9 °C)-98.7 °F (37.1 °C)] 98.5 °F (36.9 °C)  Heart Rate:  [57-75] 62  Resp:  [16-18] 16  BP: (116-150)/(64-85) 116/69      Physical Exam:  Constitutional: No acute distress, awake, alert  HENT: NCAT, mucous membranes moist  Respiratory: Clear to auscultation bilaterally, respiratory effort normal   Cardiovascular: RRR, no murmurs, rubs, or gallops  Gastrointestinal: Soft, nontender, nondistended  Musculoskeletal: Muscle tone within normal limits, no joint effusions appreciated  Psychiatric: Appropriate affect, cooperative  Neurologic: Alert and oriented, facial movements symmetric and spontaneous movement of all 4 extremities grossly equal bilaterally, speech clear  Skin: No rashes    Pertinent   and/or Most Recent Results     LAB RESULTS:      Lab 03/27/24  0358 03/25/24  2341   WBC 5.30 5.51   HEMOGLOBIN 13.0 14.2   HEMATOCRIT 39.5 42.8   PLATELETS 127* 143   NEUTROS ABS 2.27 2.38   IMMATURE GRANS (ABS) 0.01 0.02   LYMPHS ABS 2.44 2.54   MONOS ABS 0.40 0.37   EOS ABS 0.15 0.17   MCV 92.1 94.7   D DIMER QUANT  --  <0.27         Lab 03/27/24  0357 03/26/24  0843 03/25/24  2341   SODIUM 141  --  146*   POTASSIUM 4.6  --  4.1   CHLORIDE 109*  --  109*   CO2 24.0  --  27.0   ANION GAP 8.0  --  10.0   BUN 20  --  9   CREATININE 0.93  --  0.85   EGFR 71.4  --  79.5   GLUCOSE 102*  --  99   CALCIUM 8.4*  --  9.0   MAGNESIUM 2.2  --   --    HEMOGLOBIN A1C  --  5.50  --          Lab 03/25/24  2341   TOTAL PROTEIN 6.9   ALBUMIN 4.6   GLOBULIN 2.3   ALT (SGPT) 42*   AST (SGOT) 41*   BILIRUBIN 0.3   ALK PHOS 65   LIPASE 29         Lab 03/26/24  0312 03/25/24  2341   PROBNP  --  38.8   HSTROP T 28* 25*         Lab 03/26/24  0843   CHOLESTEROL 211*   LDL CHOL 147*   HDL CHOL 48   TRIGLYCERIDES 90             Brief Urine Lab Results       None          Microbiology Results (last 10 days)       ** No results found for the last 240 hours. **            Cardiac Catheterization/Vascular Study    Result Date: 3/27/2024    Normal coronary arteries in a right dominant system   Normal LVEDP of 1mmHg     Stress Test With Pet Myocardial Perfusion    Result Date: 3/26/2024    Adequate interpretation of myocardial perfusion study is limited by imaging artifact. Within these confines, there is ischemia in the mid anteroseptum and apical walls by SDS without clear evidence of perfusion deficits on raw images.   Patient denied chest pain during study   There were non-specific ST-T wave segments during stress that resolved in recovery   Left ventricular ejection fraction is normal (Calculated EF = 63%).   Associated CT imaging is without coronary calcifications   Coronary flow reserve estimation is inaccurate due to imaging artifacts    Study is of uncertain significance given imaging artifacts     Adult Transthoracic Echo Complete W/ Cont if Necessary Per Protocol    Result Date: 3/26/2024    Left ventricular systolic function is normal. Calculated left ventricular EF of 56%   Left ventricular diastolic function was normal.   Normal right ventricular size and systolic function   No significant valvular abnormality   No pericardial effusion   No prior echocardiogram for comparison     XR Chest 1 View    Result Date: 3/26/2024  XR CHEST 1 VW Date of Exam: 3/25/2024 11:55 PM EDT Indication: Chest Pain Triage Protocol Comparison: 6/4/2015. Findings: There is no pneumothorax, pleural effusion or focal airspace consolidation. Heart size and pulmonary vasculature appear within normal limits. Regional bones appear intact.     Impression: No acute cardiopulmonary abnormality. Electronically Signed: Panchito Harkins MD  3/26/2024 12:18 AM EDT  Workstation ID: [id number]             Results for orders placed during the hospital encounter of 03/25/24    Adult Transthoracic Echo Complete W/ Cont if Necessary Per Protocol    Interpretation Summary    Left ventricular systolic function is normal. Calculated left ventricular EF of 56%    Left ventricular diastolic function was normal.    Normal right ventricular size and systolic function    No significant valvular abnormality    No pericardial effusion    No prior echocardiogram for comparison      Plan for Follow-up of Pending Labs/Results: none    Discharge Details        Discharge Medications      Patient Not Prescribed Medications Upon Discharge         No Known Allergies      Discharge Disposition:      Diet:  Hospital:  Diet Order   Procedures    Diet: Diabetic; Consistent Carbohydrate; Fluid Consistency: Thin (IDDSI 0)            Activity:      Restrictions or Other Recommendations:  none       CODE STATUS:    Code Status and Medical Interventions:   Ordered at: 03/26/24 8832     Code Status (Patient has no  pulse and is not breathing):    CPR (Attempt to Resuscitate)     Medical Interventions (Patient has pulse or is breathing):    Full Support       No future appointments.              Kourtney Mcadams MD  03/27/24      Time Spent on Discharge:  I spent  25  minutes on this discharge activity which included: face-to-face encounter with the patient, reviewing the data in the system, coordination of the care with the nursing staff as well as consultants, documentation, and entering orders.

## 2024-03-27 NOTE — PROGRESS NOTES
"[facility] at [facility]  Inpatient cardiology progress note     LOS: 0 days   Patient Care Team:  Maribel Garrido MD as PCP - General (Family Medicine)    Chief Complaint: chest pain    Subjective     History of Present Illness    No events overnight. Feels well this morning.      Objective     Vital Signs    Vital Signs Range for the last 24 hours  Temp:  [98.4 °F (36.9 °C)-98.7 °F (37.1 °C)] 98.5 °F (36.9 °C)   Temp src: Oral   BP: (116-143)/(64-81) 121/81   Heart Rate:  [54-75] 57   Resp:  [17-18] 17   SpO2:  [96 %-99 %] 96 %       Device (Oxygen Therapy): room air   Weight:  [93 kg (205 lb 0.4 oz)] 93 kg (205 lb 0.4 oz)       Flowsheet Rows      Flowsheet Row First Filed Value   Admission Height 170.2 cm (67\") Documented at 03/25/2024 2329   Admission Weight 93 kg (205 lb) Documented at 03/25/2024 2329            Objective:  General Appearance:  Comfortable and in no acute distress.    Vital signs: (most recent): Blood pressure 121/81, pulse 57, temperature 98.5 °F (36.9 °C), temperature source Oral, resp. rate 17, height 170.2 cm (67.01\"), weight 93 kg (205 lb 0.4 oz), SpO2 96%.  Vital signs are normal.  No fever.    Output: Producing urine.    Lungs:  Normal effort and normal respiratory rate.    Heart: Bradycardia.  Regular rhythm.  S1 normal and S2 normal.  No murmur, gallop or friction rub.   Extremities: There is no dependent edema.    Pulses: Distal pulses are intact.    Neurological: Patient is alert and oriented to person, place and time.    Skin:  Warm and dry.              CMP          3/25/2024    23:41 3/27/2024    03:57   CMP   Glucose 99  102    BUN 9  20    Creatinine 0.85  0.93    EGFR 79.5  71.4    Sodium 146  141    Potassium 4.1  4.6    Chloride 109  109    Calcium 9.0  8.4    Total Protein 6.9     Albumin 4.6     Globulin 2.3     Total Bilirubin 0.3     Alkaline Phosphatase 65     AST (SGOT) 41     ALT (SGPT) 42     Albumin/Globulin Ratio 2.0     BUN/Creatinine Ratio 10.6 "  21.5    Anion Gap 10.0  8.0      CBC          3/25/2024    23:41 3/27/2024    03:58   CBC   WBC 5.51  5.30    RBC 4.52  4.29    Hemoglobin 14.2  13.0    Hematocrit 42.8  39.5    MCV 94.7  92.1    MCH 31.4  30.3    MCHC 33.2  32.9    RDW 12.6  12.4    Platelets 143  127      CARDIAC LABS:      Lab 03/26/24  0312 03/25/24  2341   PROBNP  --  38.8   HSTROP T 28* 25*       Echocardiogram (3/26/2024)    Left ventricular systolic function is normal. Calculated left ventricular EF of 56%    Left ventricular diastolic function was normal.    Normal right ventricular size and systolic function    No significant valvular abnormality    No pericardial effusion    No prior echocardiogram for comparison    Stress PET (3/26/2024)    Adequate interpretation of myocardial perfusion study is limited by imaging artifact. Within these confines, there is ischemia in the mid anteroseptum and apical walls by SDS without clear evidence of perfusion deficits on raw images.    Patient denied chest pain during study    There were non-specific ST-T wave segments during stress that resolved in recovery    Left ventricular ejection fraction is normal (Calculated EF = 63%).    Associated CT imaging is without coronary calcifications    Coronary flow reserve estimation is inaccurate due to imaging artifacts    Study is of uncertain significance given imaging artifacts       Results Review:     I reviewed the patient's new clinical results.    Ejection Fraction  Lab Results   Component Value Date    EF 63 03/26/2024       Medication Review:     acetaminophen    aspirin    heparin (porcine)    melatonin    nitroglycerin    ondansetron ODT **OR** ondansetron    sodium chloride    sodium chloride    sodium chloride    sodium chloride      Assessment & Plan       Chest pain    # Precordial chest pain  # hypertension  # Hyperlipidemia    Patient presented with chest pressure. Troponin indeterminate. Echocardiogram unremarkable. Stress test is  indeterminate. Plan today for definitive ischemic evaluation with coronary angiogram +/- PCI as indicated. The risks, benefits and alternatives were discussed with the patient and her  and they agree to wish to proceed. If unremarkable, can discharge from a cardiology perspective. Otherwise, further recommendations will follow pending results of angiogram.    Sean Regalado MD, [facility]  Interventional Cardiology  [facility]

## 2024-06-09 NOTE — Clinical Note
Patient was given Post Procedure instruction by the staff. Pt had a procedure done this past Friday morning- insertion of implantable loop recorder s/p CVA. All day yesterday and so far today, she is not feeling herself. She is very tired, keeps wanting to sleep, feels like she is heavily sedated and trying hard to come out of the sedation. She felt ok Friday after the procedure. Also feels jittery, unstable. Able to walk without assistance but has to walk slow. Can perform ADL's, just feels like she is moving in slow motion. Head feels very foggy. Gets a sharp stabbing pain in her head every now and then but it does not last long. Most recent /80's. Confused- does not know what day it is and pts  states she is weak and more confused than normal.    Dispo- call 911 now. Pt and  advised. They VU.  Reason for Disposition   Difficult to awaken or acting confused (e.g., disoriented, slurred speech)    Additional Information   Negative: SEVERE difficulty breathing (e.g., struggling for each breath, speaks in single words)   Negative: Shock suspected (e.g., cold/pale/clammy skin, too weak to stand, low BP, rapid pulse)    Protocols used: Weakness (Generalized) and Fatigue-A-AH

## (undated) DEVICE — MODEL AT P65, P/N 701554-001KIT CONTENTS: HAND CONTROLLER, 3-WAY HIGH-PRESSURE STOPCOCK WITH ROTATING END AND PREMIUM HIGH-PRESSURE TUBING: Brand: ANGIOTOUCH® KIT

## (undated) DEVICE — CATH DIAG EXPO M/ PK 5F FL4/FR4 PIG

## (undated) DEVICE — TR BAND RADIAL ARTERY COMPRESSION DEVICE: Brand: TR BAND

## (undated) DEVICE — CVR PROB ULTRASND/TRANSD W/GEL 7X11IN STRL

## (undated) DEVICE — GLIDESHEATH SLENDER STAINLESS STEEL KIT: Brand: GLIDESHEATH SLENDER

## (undated) DEVICE — PK CATH CARD 10

## (undated) DEVICE — ST EXT IV SMRTSTE 2VLV FIX M LL 6ML 41

## (undated) DEVICE — MODEL BT2000 P/N 700287-012KIT CONTENTS: MANIFOLD WITH SALINE AND CONTRAST PORTS, SALINE TUBING WITH SPIKE AND HAND SYRINGE, TRANSDUCER: Brand: BT2000 AUTOMATED MANIFOLD KIT

## (undated) DEVICE — GW PERIPH GUIDERIGHT STD/EXCHNG/J/TIP SS 0.035IN 5X260CM

## (undated) DEVICE — ADULT, W/LG. BACK PAD, RADIOTRANSPARENT ELEMENT AND LEAD WIRE COMPATIBLE W/: Brand: DEFIBRILLATION ELECTRODES

## (undated) DEVICE — ST INF PRI SMRTSTE 20DRP 2VLV 24ML 117